# Patient Record
Sex: FEMALE | Race: WHITE | NOT HISPANIC OR LATINO | Employment: STUDENT | URBAN - METROPOLITAN AREA
[De-identification: names, ages, dates, MRNs, and addresses within clinical notes are randomized per-mention and may not be internally consistent; named-entity substitution may affect disease eponyms.]

---
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
## 2024-01-05 NOTE — PATIENT INSTRUCTIONS
Body Image in Adolescents   AMBULATORY CARE:   Body image  is the way you feel about your body and physical appearance  You may not like the way you look or the shape of your body  You may like some things about your body but not others  Your thoughts and feelings may change over time  Body image problems can be mild or severe  A severe body image problem can lead to long-term problems such as anorexia  A healthy body image is an important part of self-esteem (thinking you are valuable)  Common signs and symptoms of body image problems:  Some parts of your appearance will change over time  This may make negative thoughts temporary  Signs and symptoms that continue or become worse may be a sign of a more serious body image problem  Any of the following can become a long-term problem:  · Critical thoughts about your body or appearance    · A need to ask other people about how you look    · Not believing someone who compliments how you look    · A need to look in the mirror often, or not wanting to look in the mirror at all    · Spending long periods of time getting dressed or working on hair or makeup but still not being satisfied    · Seeing something in the mirror that is different from what people tell you they see    · Thoughts of something you want to change, such as your weight, that happen constantly    · Not wanting to be seen in public because you think others have negative thoughts of your appearance    · A need to eat very little or to count every calorie you eat    · Use of diet pills, smoking cigarettes, or exercising too much to increase weight loss  Call 911 if:   · You have thoughts of harming yourself, or you did something to harm yourself  Seek care immediately if:   · Your heart is beating fast     · You fainted  Contact your healthcare provider if:   · You have questions or concerns about your condition or care  Develop a healthy body image:   · Think of everything you like about yourself  Focus on qualities that are separate from your appearance  For example, you may be a talented artist or musician  You may like that you make friends easily or that you are always kind to people  · Do not compare yourself with anyone  You may have family members or friends you think are more attractive than you  Do not focus on differences or try to make yourself look like someone else  You are unique and can develop an appreciation for all of your own physical qualities  · Talk to someone you trust   A parent, friend, or school counselor can help you talk about how you feel about your body  Ask for more information on gender identity if this is creating body image problems  · Remember that puberty affects your appearance  You may gain weight and grow quickly during puberty  You may think your arms or legs are too long or your nose is too big  These are normal changes that will become balanced when puberty ends  You may start having acne from hormone changes  Your healthcare provider may be able to suggest ways to control acne or other problems during puberty  Develop a strong, healthy body:   · Do not focus on changes you think you need to make  For example, your healthcare provider can tell you if your weight is in a healthy range  Ask him to help you create a nutrition and exercise plan  Do not exercise too much or eat too little  Focus on eating healthy foods and exercising as part of a healthy lifestyle  A number on the scale or a clothing size should not be your goal     · Do not go on a diet  You need a variety of foods every day to get the nutrition you need  Do not get upset with yourself if you eat something that is not healthy  Just get back to the plan  Fad or crash diets are very dangerous, and you may gain back any weight lost  Aim for slow, steady weight loss with a goal of reaching a healthy weight  · Get more activity    Limit TV, computer, or video game time to less than 2 hours done per day  Aim for at least 1 hour of physical activity each day  Find activities you enjoy so you will want to continue  Ask your family or friends to do activities with you  This can help you establish a routine and may make activity more fun  · Do not smoke  Cigarettes contain nicotine  Nicotine is harmful to your health and can cause long-term health problems  E-cigarettes and smokeless tobacco products also contain nicotine and should not be used  Talk to your parents or someone you trust if you feel pressured to use a tobacco product, or if you need help quitting  Nicotine will not help you control your weight or fit in with members of a group  Remember your goals of building self-esteem and a strong, healthy body  Follow up with your healthcare provider as directed:  Write down your questions so you remember to ask them during your visits  © 2017 [address] [employer] Information is for End User's use only and may not be sold, redistributed or otherwise used for commercial purposes  All illustrations and images included in CareNotes® are the copyrighted property of A D A M , Inc  or Farhan Leo  The above information is an  only  It is not intended as medical advice for individual conditions or treatments  Talk to your doctor, nurse or pharmacist before following any medical regimen to see if it is safe and effective for you

## 2024-02-19 DIAGNOSIS — G93.2 PSEUDOTUMOR CEREBRI: ICD-10-CM

## 2024-02-20 RX ORDER — ACETAZOLAMIDE 500 MG/1
500 CAPSULE, EXTENDED RELEASE ORAL 2 TIMES DAILY
Qty: 180 CAPSULE | Refills: 2 | OUTPATIENT
Start: 2024-02-20

## 2024-03-12 ENCOUNTER — OFFICE VISIT (OUTPATIENT)
Dept: URGENT CARE | Facility: CLINIC | Age: 19
End: 2024-03-12
Payer: COMMERCIAL

## 2024-03-12 VITALS
HEART RATE: 81 BPM | OXYGEN SATURATION: 98 % | TEMPERATURE: 99 F | RESPIRATION RATE: 18 BRPM | SYSTOLIC BLOOD PRESSURE: 132 MMHG | WEIGHT: 269.6 LBS | DIASTOLIC BLOOD PRESSURE: 64 MMHG | BODY MASS INDEX: 46.48 KG/M2

## 2024-03-12 DIAGNOSIS — J02.9 ACUTE PHARYNGITIS, UNSPECIFIED ETIOLOGY: Primary | ICD-10-CM

## 2024-03-12 LAB — S PYO AG THROAT QL: NEGATIVE

## 2024-03-12 PROCEDURE — 99213 OFFICE O/P EST LOW 20 MIN: CPT | Performed by: NURSE PRACTITIONER

## 2024-03-12 PROCEDURE — 87880 STREP A ASSAY W/OPTIC: CPT | Performed by: NURSE PRACTITIONER

## 2024-03-12 RX ORDER — AMOXICILLIN 500 MG/1
500 CAPSULE ORAL EVERY 12 HOURS SCHEDULED
Qty: 20 CAPSULE | Refills: 0 | Status: SHIPPED | OUTPATIENT
Start: 2024-03-12 | End: 2024-03-22

## 2024-03-12 NOTE — PROGRESS NOTES
[facility]        NAME: Shasta Kenney is a 18 y.o. female  : 2005    MRN: [id number]  DATE: 2024  TIME: 3:43 PM    Assessment and Plan   Acute pharyngitis, unspecified etiology [J02.9]  1. Acute pharyngitis, unspecified etiology  POCT rapid strepA    amoxicillin (AMOXIL) 500 mg capsule        Acute symptomatic POCT rapid strep negative.  Educated patient most likely viral in origin no recommendation for antibiotic at this time however will provide prescription for patient for amoxicillin 500 mg twice daily x 10 days if no improvement in the next couple days can start taking if symptoms worsen or no improvement should follow-up with PCP    Patient Instructions       Follow up with PCP in 3-5 days.  Proceed to  ER if symptoms worsen.    If tests have been performed at [facility], our office will contact you with results if changes need to be made to the care plan discussed with you at the visit.  You can review your full results on [facility].    Chief Complaint     Chief Complaint   Patient presents with   • Sore Throat   • Nasal Congestion     Patient reports having symptoms for about 2 days.          History of Present Illness       Patient is a 18-year-old female arrives with complaints of sore throat and nasal congestion fatigue x 2 days.  Does report slight cough and sneezing.  Denies all other symptoms denies shortness of breath chest pain  And fever        Review of Systems   Review of Systems   Constitutional:  Negative for activity change, appetite change, chills, fatigue and fever.   HENT:  Positive for congestion, sneezing and sore throat. Negative for postnasal drip and rhinorrhea.    Respiratory:  Positive for cough. Negative for chest tightness, shortness of breath and wheezing.    Cardiovascular:  Negative for chest pain and palpitations.   Gastrointestinal:  Negative for abdominal pain, constipation, diarrhea, nausea and vomiting.   Musculoskeletal:  Negative for  arthralgias and myalgias.   Skin:  Negative for color change, pallor and rash.   Neurological:  Negative for dizziness, weakness, light-headedness and headaches.   Hematological:  Negative for adenopathy.   Psychiatric/Behavioral:  Negative for agitation and confusion.          Current Medications       Current Outpatient Medications:   •  amoxicillin (AMOXIL) 500 mg capsule, Take 1 capsule (500 mg total) by mouth every 12 (twelve) hours for 10 days, Disp: 20 capsule, Rfl: 0  •  acetaZOLAMIDE (DIAMOX) 500 mg capsule, Take 1 capsule (500 mg total) by mouth 2 (two) times a day, Disp: 60 capsule, Rfl: 3  •  fluticasone (FLONASE) 50 mcg/act nasal spray, 2 sprays into each nostril daily (Patient not taking: Reported on 1/26/2023), Disp: 16 g, Rfl: 0  •  ondansetron (ZOFRAN) 4 mg tablet, Take 1 tablet (4 mg total) by mouth every 6 (six) hours as needed for nausea or vomiting (Patient not taking: Reported on 1/26/2023), Disp: 12 tablet, Rfl: 0    Current Allergies     Allergies as of 03/12/2024   • (No Known Allergies)            The following portions of the patient's history were reviewed and updated as appropriate: allergies, current medications, past family history, past medical history, past social history, past surgical history and problem list.     Past Medical History:   Diagnosis Date   • Osgood-Schlatter's disease, left        History reviewed. No pertinent surgical history.    Family History   Problem Relation Age of Onset   • Diabetes Mother    • Hypertension Mother    • No Known Problems Father    • Migraines Neg Hx    • Seizures Neg Hx          Medications have been verified.        Objective   /64   Pulse 81   Temp 99 °F (37.2 °C) (Tympanic)   Resp 18   Wt 122 kg (269 lb 9.6 oz)   SpO2 98%   BMI 46.48 kg/m²   No LMP recorded.       Physical Exam     Physical Exam  Vitals and nursing note reviewed.   Constitutional:       General: She is not in acute distress.     Appearance: Normal appearance.  She is ill-appearing. She is not diaphoretic.   HENT:      Head: Normocephalic and atraumatic.      Right Ear: Tympanic membrane, ear canal and external ear normal. There is no impacted cerumen.      Left Ear: Tympanic membrane, ear canal and external ear normal. There is no impacted cerumen.      Nose: Congestion present. No rhinorrhea.      Mouth/Throat:      Pharynx: Posterior oropharyngeal erythema present.   Eyes:      General: No scleral icterus.        Right eye: No discharge.         Left eye: No discharge.      Conjunctiva/sclera: Conjunctivae normal.   Cardiovascular:      Rate and Rhythm: Normal rate and regular rhythm.   Pulmonary:      Effort: Pulmonary effort is normal. No respiratory distress.      Breath sounds: Normal breath sounds. No stridor. No wheezing, rhonchi or rales.   Musculoskeletal:         General: Normal range of motion.      Cervical back: Normal range of motion.   Lymphadenopathy:      Cervical: Cervical adenopathy present.   Skin:     Coloration: Skin is not jaundiced or pale.      Findings: No bruising, erythema or rash.   Neurological:      General: No focal deficit present.      Mental Status: She is alert and oriented to person, place, and time.   Psychiatric:         Mood and Affect: Mood normal.         Behavior: Behavior normal.         Thought Content: Thought content normal.         Judgment: Judgment normal.

## 2024-03-12 NOTE — LETTER
March 12, 2024     Patient: Shasta Kenney   YOB: 2005   Date of Visit: 3/12/2024       To Whom it May Concern:    Shasta Kenney was seen in my clinic on 3/12/2024. She may return to school on 3/15/2024 . Can return sooner if symptoms improve.    If you have any questions or concerns, please don't hesitate to call.         Sincerely,          LULY Alvarez

## 2024-04-03 ENCOUNTER — TELEPHONE (OUTPATIENT)
Dept: NEUROLOGY | Facility: CLINIC | Age: 19
End: 2024-04-03

## 2024-05-13 ENCOUNTER — TELEPHONE (OUTPATIENT)
Dept: NEUROLOGY | Facility: CLINIC | Age: 19
End: 2024-05-13

## 2024-05-13 NOTE — TELEPHONE ENCOUNTER
Called patient and left voicemail to confirm their upcoming appointment with Dr. Arredondo. Informed patient about arriving in the [place] location 15 minutes prior to appointment to get checked in and go over chart.

## 2024-05-20 ENCOUNTER — OFFICE VISIT (OUTPATIENT)
Dept: NEUROLOGY | Facility: CLINIC | Age: 19
End: 2024-05-20
Payer: COMMERCIAL

## 2024-05-20 VITALS
SYSTOLIC BLOOD PRESSURE: 136 MMHG | TEMPERATURE: 97.9 F | HEIGHT: 64 IN | DIASTOLIC BLOOD PRESSURE: 86 MMHG | WEIGHT: 274.5 LBS | OXYGEN SATURATION: 99 % | BODY MASS INDEX: 46.86 KG/M2 | HEART RATE: 97 BPM

## 2024-05-20 DIAGNOSIS — R29.818 SUSPECTED SLEEP APNEA: ICD-10-CM

## 2024-05-20 DIAGNOSIS — G93.2 IIH (IDIOPATHIC INTRACRANIAL HYPERTENSION): Primary | ICD-10-CM

## 2024-05-20 DIAGNOSIS — E53.8 VITAMIN B 12 DEFICIENCY: ICD-10-CM

## 2024-05-20 DIAGNOSIS — E55.9 VITAMIN D DEFICIENCY: ICD-10-CM

## 2024-05-20 DIAGNOSIS — G43.009 MIGRAINE WITHOUT AURA AND WITHOUT STATUS MIGRAINOSUS, NOT INTRACTABLE: ICD-10-CM

## 2024-05-20 PROBLEM — S06.0X9A CONCUSSION WITH MODERATE (1-24 HOURS) LOSS OF CONSCIOUSNESS: Status: RESOLVED | Noted: 2019-09-17 | Resolved: 2024-05-20

## 2024-05-20 PROCEDURE — 99205 OFFICE O/P NEW HI 60 MIN: CPT | Performed by: PSYCHIATRY & NEUROLOGY

## 2024-05-20 PROCEDURE — 99417 PROLNG OP E/M EACH 15 MIN: CPT | Performed by: PSYCHIATRY & NEUROLOGY

## 2024-05-20 RX ORDER — ACETAZOLAMIDE 500 MG/1
500 CAPSULE, EXTENDED RELEASE ORAL 2 TIMES DAILY
Qty: 180 CAPSULE | Refills: 4 | Status: SHIPPED | OUTPATIENT
Start: 2024-05-20

## 2024-05-20 NOTE — PROGRESS NOTES
Review of Systems   Constitutional:  Negative for appetite change, fatigue and fever.   HENT: Negative.  Negative for hearing loss, tinnitus, trouble swallowing and voice change.    Eyes: Negative.  Negative for photophobia, pain and visual disturbance.   Respiratory: Negative.  Negative for shortness of breath.    Cardiovascular: Negative.  Negative for palpitations.   Gastrointestinal: Negative.  Negative for nausea and vomiting.   Endocrine: Negative.  Negative for cold intolerance.   Genitourinary: Negative.  Negative for dysuria, frequency and urgency.   Musculoskeletal:  Negative for back pain, gait problem, myalgias, neck pain and neck stiffness.   Skin: Negative.  Negative for rash.   Allergic/Immunologic: Negative.    Neurological:  Positive for numbness (in both feet, mainly right - pins and needles - occasional). Negative for dizziness, tremors, seizures, syncope, facial asymmetry, speech difficulty, weakness, light-headedness and headaches.   Hematological: Negative.  Does not bruise/bleed easily.   Psychiatric/Behavioral: Negative.  Negative for confusion, hallucinations and sleep disturbance.    All other systems reviewed and are negative.

## 2024-05-20 NOTE — PATIENT INSTRUCTIONS
"Please do follow-up with ophthalmology for regular eye exams and please have note faxed to me      Whenever the sleep study is coming up if you want to just reach out to me and we may at least hold the p.m. dose because you generally do okay off of the p.m. dose and this medication can treat sleep apnea and cause a problem to be underestimated.    Do continue to not sleep on your back normally  -If you feel like you could sleep on your back that night only, certainly could try to sleep on your back for the sleep study, but not if you feel like you cannot sleep this way.  Just getting sleep is the most important thing, as the machine thinks you are sleeping the entire time it is plugged in. Otherwise, we discussed home sleep study can underestimate the problem.  If it comes back that you almost have sleep apnea or other sleep disorder, but not meeting criteria, we could consider in lab study and reach out to me if you would like this ordered before next visit.    I am placing a referral for a sleep study and if it is abnormal, it includes a referral to the sleep medicine specialist team to further evaluate your sleep issues.    Please call [phone] to schedule the sleep study and we discussed due to the new order being labeled \" ambulatory referral to sleep medicine\" you have to wait 2-6 days for the sleep team to turn this referral into the actual sleep study order that you can schedule, otherwise if you call now they may say there is no order for sleep study, just an order for a referral, because they can not see it yet on their end.  The referral to sleep medicine piece is only if there is abnormalities and you need to see them afterwards.    After the sleep study is completed if there is abnormal results that need treatment, I recommend you see one of the following providers:  Pino Eldridge DO   Carlos Iglesias DO  Daniel Perez " LULY Coates PA-C        Headache/migraine treatment:   Rescue medications (for immediate treatment of a headache):   It is ok to take ibuprofen, acetaminophen or naproxen (Advil, Tylenol,  Aleve, Excedrin) if they help your headaches you should limit these to No more than 3 times a week to avoid medication overuse/rebound headaches.         Over the counter preventive supplements for headaches/migraines (if you try, try for 3 months straight)  (to take every day to help prevent headaches - not to take at the time of headache):  There are combo pills online of these - none of which regulated by FDA and double check dosing - take appropriate dose only once a day- preventa migraine, migravent, mind ease, migrelief   [] Magnesium 400 - 500 mg daily (If any diarrhea or upset stomach, decrease dose  as tolerated)  [] Riboflavin (Vitamin B2) 400mg daily - try online   (FYI B2 may make your urine bright/neon yellow)  AND/OR  [] Herbal medication: Petasites/Butterbur 150 mg daily - try online  (When choosing your Butterbur online or in the store, beware that there are some poor preps containing pyrrolizidine alkaloids (PAs) that can be harmful to the liver. Therefore, do not use butterbur products that are not labeled as PA-free.)      Prescription preventive medications for headaches/migraines   (to take every day to help prevent headaches - not to take at the time of headache):  [x] I would recommend a list to have a sleep study coming up in the next week then continue to take the medication once a day if so otherwise I recommend increasing it to at least twice daily dosing prior to checking the labs that I ordered -and the twice a day dosing you could either take at 8/9 AM and 4/5 PM or 8/9 AM and 8/9 PM which ever you find works better for your body and is easier to remember        *Typically these types of medications take time until you see the benefit, although some may see improvement in days, often it may  take weeks, especially if the medication is being titrated up to a beneficial level. Please contact us if there are any concerns or questions regarding the medication.     Lifestyle Recommendations:  [x] SLEEP - Maintain a regular sleep schedule: Adults need at least 7-8 hours of uninterrupted a night. Maintain good sleep hygiene:  Going to bed and waking up at consistent times, avoiding excessive daytime naps, avoiding caffeinated beverages in the evening, avoid excessive stimulation in the evening and generally using bed primarily for sleeping.  One hour before bedtime would recommend turning lights down lower, decreasing your activity (may read quietly, listen to music at a low volume). When you get into bed, should eliminate all technology (no texting, emailing, playing with your phone, iPad or tablet in bed).  [x] HYDRATION - Maintain good hydration.  Drink  2L of fluid a day (4 typical small water bottles)  [x] DIET - Maintain good nutrition. In particular don't skip meals and try and eat healthy balanced meals regularly.  [x] TRIGGERS - Look for other triggers and avoid them: Limit caffeine to 1-2 cups a day or less. Avoid dietary triggers that you have noticed bring on your headaches (this could include aged cheese, peanuts, MSG, aspartame and nitrates).  [x] EXERCISE - physical exercise as we all know is good for you in many ways, and not only is good for your heart, but also is beneficial for your mental health, cognitive health and  chronic pain/headaches. I would encourage at the least 5 days of physical exercise weekly for at least 30 minutes.     Education and Follow-up  [x] Please call with any questions or concerns. Of course if any new concerning symptoms go to the emergency department.  [x] Follow up 3 months, sooner if needed

## 2024-05-20 NOTE — PROGRESS NOTES
[facility] Consult  PATIENT:  Shasta Kenney  MRN:  [id number]  :  2005  DATE OF SERVICE:  2024  REFERRED BY: Self, Referral  PMD: Herminia Sharp DO    Assessment/Plan:     Shasta Kenney is a delightful 18 y.o. female with a past medical history that includes chronic fatigue, history of concussion, depression, right ankle instability, neck tension, idiopathic intracranial hypertension, headaches, migraines, Osgood Schlatter's disease of left lower extremity and pain, irregular menses, HSV, swollen gums, BMI over 45, rhinitis, nausea, class IV Mallampati score (Childrens tooth brush), chronic back pain  referred here for evaluation of headache.  My initial evaluation 2024     IIH (idiopathic intracranial hypertension)  Migraine without aura and without status migrainosus, not intractable  Suspected sleep apnea  She reports she has had headaches dating back to her childhood potentially, had a handful of sports related concussions, but headaches significantly worsened around age 15, she went snowboarding which was not atypical as she was an instructor at the time and the next day awoke and felt like she could not move and every time she would stand up her vision would go black.  Per chart review unrelenting headache started around 2021 unprovoked, 1/15/2021 ophthalmology found papilledema and visual field deficits on the periphery, admitted where lumbar puncture was technically limited and opening pressure was not able to be measured, had MRI MRV and CT and began Diamox/acetazolamide 2021.  She subsequently followed with pediatric neurology both at [facility] and [facility], please see EMR and HPI for details.  She has not followed up with neurology at least since 2023 and Shasta has been periodically compliant with dosing, but trying her best.  As of initial visit 2024 she has not seen ophthalmology in years either.  She is here to establish care.   She reports headaches are typically apex of scalp and sometimes down the temporal region, pressure, without typical aura and with typical associated migrainous features as well as features expected from IIH.  She is aware of severe and permanent vision loss if this is not adequately treated.  - As of 5/20/2024: She reports currently she only takes acetazolamide long-acting once in the a.m. typically, which has been more helpful than the short acting which we discussed why this might be.  She reports headaches approximately 2-3 times a month, but vision changes are constant episodically and depends on the night sleep.  We discussed she needs to follow-up with ophthalmology ASAP to determine level of papilledema, OCT, visual fields and she needs to start taking acetazolamide 500 mg twice daily at minimum rather than just once in the morning as she is likely having wearing off the rest of the day.  We discussed once she obtains the labs on this dose twice daily, depending on papilledema, how she tolerates the medication as far as electrolytes, we could consider 3 times daily dosing.  We also discussed I suspect she has sleep apnea underlying this and home sleep study ordered although acetazolamide also treat sleep apnea and thus may underestimate the problem significantly and may need in lab study to further evaluate.    Workup:  - MRI Brain with and without contrast 1/22/21: No acute intracranial abnormality. Mild papilledema with equivocal findings of intracranial hypertension. 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  Please see the separate MRA study report.  She ended up following up with pediatric neurology at [facility] who informed her that the right transverse dural venous sinus issue was developmental or congenital.*As of my retrospective review 5/20/2024 we discussed that she does have features of idiopathic intracranial hypertension on imaging including  partially empty sella, prominent optic nerve sheath bilaterally left greater than right, tight ventricles, cerebellar tonsils overlie the foramen magnum with tight junction  - MRV head without contrast 1/22/21: Approximate 4 cm region of marked stenosis in the right distal transverse and proximal sigmoid dural venous sinus.  This is likely developmental or congenital.*As of my retrospective review 5/20/2024 we discussed the [place] behind these findings and how DrJyotsna Still do not quite understand for sure whether this is developmental/congenital and the pressure is worse on that side because of this or potentially secondary to increased intracranial pressure collapsing the venous sinus, basically chicken/egg scenario and certainly sounds like intervention was considered in 2021 and not pursued due to thought to be congenital  -     Comprehensive metabolic panel  -     CBC and differential  -     TSH, 3rd generation with Free T4 reflex  -     Vitamin B12  -     Vitamin D 25 hydroxy    Preventative:  - we discussed headache hygiene and lifestyle factors that may improve headaches  -  increase -   acetaZOLAMIDE (DIAMOX) 500 mg capsule; Take 1 capsule (500 mg total) by mouth 2 (two) times a day -we discussed unless she has upcoming sleep study where we are going to hold the p.m. dose of acetazolamide since she has not been taking it more than once a day generally anyway, she absolutely should be taking this medication at least twice daily otherwise I suspect is wearing off and potentially causing rebound higher pressure.  Once we see what her labs are on the twice daily dosing we could even consider 3 times daily dosing to see if this could help more and she needs to follow back up with eye doctor for dilated eye exam to assess for papilledema. Discussed proper use, possible side effects and risks.  - Currently on through other providers: I am taking over acetazolamide prescription  - Past/ failed/contraindicated:  Tolerated magnesium, riboflavin  - future options: Topiramate/Topamax, methazolamide, optic nerve sheath fenestration, shunt, stent*, furosemide/Lasix, CGRP med, botox    Rescue:  - recommend not taking over-the-counter or prescription pain medications more than 3 days per week to prevent medication overuse/rebound headache  - She is not currently interested in prescription rescue option  - Currently on through other providers: -acetaminophen is all she takes typically  - Past/ failed/contraindicated: OTC meds  -Past/tolerated: Medrol Dosepak, prednisone, migraine cocktail 2021 ketorolac/Toradol, IV fluids, metoclopramide, Benadryl  - future options:  Triptan,  prochlorperazine, Toradol IM or p.o., could consider trial of 5 days of Depakote 500 mg nightly or dexamethasone 2 mg daily for prolonged migraine, ubrelvy, reyvow, nurtec    Suspected sleep apnea  -     Ambulatory Referral to Sleep Medicine; Future. This is not a referral for sleep medicine only, it is referral for a home sleep study and includes referral to sleep medicine if abnormal.    Patient instructions      Please do follow-up with ophthalmology for regular eye exams and please have note faxed to me      Whenever the sleep study is coming up if you want to just reach out to me and we may at least hold the p.m. dose because you generally do okay off of the p.m. dose and this medication can treat sleep apnea and cause a problem to be underestimated.    Do continue to not sleep on your back normally  -If you feel like you could sleep on your back that night only, certainly could try to sleep on your back for the sleep study, but not if you feel like you cannot sleep this way.  Just getting sleep is the most important thing, as the machine thinks you are sleeping the entire time it is plugged in. Otherwise, we discussed home sleep study can underestimate the problem.  If it comes back that you almost have sleep apnea or other sleep disorder, but not  "meeting criteria, we could consider in lab study and reach out to me if you would like this ordered before next visit.    I am placing a referral for a sleep study and if it is abnormal, it includes a referral to the sleep medicine specialist team to further evaluate your sleep issues.    Please call [phone] to schedule the sleep study and we discussed due to the new order being labeled \" ambulatory referral to sleep medicine\" you have to wait 2-6 days for the sleep team to turn this referral into the actual sleep study order that you can schedule, otherwise if you call now they may say there is no order for sleep study, just an order for a referral, because they can not see it yet on their end.  The referral to sleep medicine piece is only if there is abnormalities and you need to see them afterwards.    After the sleep study is completed if there is abnormal results that need treatment, I recommend you see one of the following providers:  Pino Eldridge DO   Carlos Coates PA-C      Headache/migraine treatment:   Rescue medications (for immediate treatment of a headache):   It is ok to take ibuprofen, acetaminophen or naproxen (Advil, Tylenol,  Aleve, Excedrin) if they help your headaches you should limit these to No more than 3 times a week to avoid medication overuse/rebound headaches.       Over the counter preventive supplements for headaches/migraines (if you try, try for 3 months straight)  (to take every day to help prevent headaches - not to take at the time of headache):  There are combo pills online of these - none of which regulated by FDA and double check dosing - take appropriate dose only once a day- preventa migraine, migravent, mind ease, migrelief   [] Magnesium 400 - 500 mg daily (If any diarrhea or upset stomach, decrease dose  as tolerated)  [] Riboflavin (Vitamin B2) 400mg " daily - try online   (FYI B2 may make your urine bright/neon yellow)  AND/OR  [] Herbal medication: Petasites/Butterbur 150 mg daily - try online  (When choosing your Butterbur online or in the store, beware that there are some poor preps containing pyrrolizidine alkaloids (PAs) that can be harmful to the liver. Therefore, do not use butterbur products that are not labeled as PA-free.)      Prescription preventive medications for headaches/migraines   (to take every day to help prevent headaches - not to take at the time of headache):  [x] I would recommend a list to have a sleep study coming up in the next week then continue to take the medication once a day if so otherwise I recommend increasing it to at least twice daily dosing prior to checking the labs that I ordered -and the twice a day dosing you could either take at 8/9 AM and 4/5 PM or 8/9 AM and 8/9 PM which ever you find works better for your body and is easier to remember        *Typically these types of medications take time until you see the benefit, although some may see improvement in days, often it may take weeks, especially if the medication is being titrated up to a beneficial level. Please contact us if there are any concerns or questions regarding the medication.     Lifestyle Recommendations:  [x] SLEEP - Maintain a regular sleep schedule: Adults need at least 7-8 hours of uninterrupted a night. Maintain good sleep hygiene:  Going to bed and waking up at consistent times, avoiding excessive daytime naps, avoiding caffeinated beverages in the evening, avoid excessive stimulation in the evening and generally using bed primarily for sleeping.  One hour before bedtime would recommend turning lights down lower, decreasing your activity (may read quietly, listen to music at a low volume). When you get into bed, should eliminate all technology (no texting, emailing, playing with your phone, iPad or tablet in bed).  [x] HYDRATION - Maintain good  hydration.  Drink  2L of fluid a day (4 typical small water bottles)  [x] DIET - Maintain good nutrition. In particular don't skip meals and try and eat healthy balanced meals regularly.  [x] TRIGGERS - Look for other triggers and avoid them: Limit caffeine to 1-2 cups a day or less. Avoid dietary triggers that you have noticed bring on your headaches (this could include aged cheese, peanuts, MSG, aspartame and nitrates).  [x] EXERCISE - physical exercise as we all know is good for you in many ways, and not only is good for your heart, but also is beneficial for your mental health, cognitive health and  chronic pain/headaches. I would encourage at the least 5 days of physical exercise weekly for at least 30 minutes.     Education and Follow-up  [x] Please call with any questions or concerns. Of course if any new concerning symptoms go to the emergency department.  [x] Follow up 3 months, sooner if needed        CC:   We had the pleasure of evaluating Shasta Kenney in neurological consultation today. Shasta Kenney is a   right handed female who presents today for evaluation of headaches.     History obtained from patient as well as available medical record review.  History of Present Illness:   Current medical illnesses  or past medical history include chronic fatigue, history of concussion, depression, right ankle instability, neck tension, idiopathic intracranial hypertension, headaches, migraines, Osgood Schlatter's disease of left lower extremity and pain, irregular menses, HSV, swollen gums, BMI over 45, rhinitis, nausea, class IV Mallampati score (Childrens tooth brush), chronic back pain     She is in nursing school     She previously followed with pediatric neurology Dr. Pedraza  - otherwise healthy except for past sports related concussion until onset of unrelenting headaches 1/6/2021 which was not provoked by anything particular at the time.  On 1/15/2021 ophthalmology found papilledema and visual field deficits,  "suspected IIH, sent to [facility] for admission where she had labs, LP, CT and per report \"LP was technically limited and opening pressure was not able to be measured.\"  Had MRI/MRV outpatient.  Began Diamox 1/22/2021.  Ophthalmologist a week later made no changes  -Dr. Pedraza referred her due to MRV stenosis to Dr. Marleni James 3/2021 at the [facility] at [facility]. whose note was reviewed and appears they discussed   - incidental finding of narrowing on MRV, pseudotumor/idiopathic intracranial hypertension, continue acetazolamide/Diamox (at that time was on 250 mg tabs to twice daily), dose and duration to be determined mainly by ophthalmologist.  They instructed her not to drive until she has recovered and avoid collision sports until peripheral vision has normalized.  - Dr. Pedraza -appears last visit 1/26/2023 clinically improved, but had self discontinued acetazolamide/Diamox fall 2022 due to noncompliance -she requested transfer of care to an adult neurologist due to age.  She recommended resuming acetazolamide/Diamox up to 500 mg twice daily and follow-up with ophthalmology.  Since she had increased weight gain over the past year of about 30 pounds referred to neurology, discussed endocrinology if A1c was elevated, discussed diet, hydration, sleep.      Following with nutrition and weight management visit coming  Usually focuses better with a lot on in the background     Headaches started at what age?  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   - as of 5/20/2024: has been on diamox for a year in the past, went back on when she gets back into a routine, started around the beginning of last semester September 2023 and this semester January 2024 but always tries to take when she can remember    In a month 2-3 a month  Vision changes are constant, depend on lasts night sleep - if any trouble sleeping which can happen after bad nights sleep will call out since would never drive if could not see well and sometimes it is worse than when I the other and always worse the entire periphery    What time of the day do the headaches start? Worse in the am and gets better  How long do the headaches last? Over 4 hours without meds - 2 days with meds even  Are you ever headache free? Yes     Aura? Without aura - not classically     Last eye exam:   -She reports she follows with Dr. Lopez and she cannot remember what exactly he said last just tells her she is stable/stagnant and just lose weight - can't recall last visit    Vision changes are constant, depend on lasts night sleep - if any trouble sleeping which can happen after bad nights sleep will call out since would never drive if could not see well and sometimes it is worse than when I the other and always worse the entire periphery    Where is your headache located and pain quality?   - apex bilateral and temporal - pressure and like something is trying to escape out of the apex center of her scalp    What is the intensity of pain? Average: 4-5/10, worst 8/10  Associated symptoms:   [x] Nausea       [] Vomiting         [x] Photophobia     [x]Phonophobia   - less   [] Osmophobia - just axe body spray   [x] Blurred vision   [x] Light-headed or dizzy - vertigo in the am   [x] Tinnitus - sometimes both ears - 2-3 mins, not often  Used to get frequent ear infections    [] Hands or feet tingle or feel numb/paresthesias  - not  associated with headache - occasional pins and needles related to prolonged times and resolves with walking   [] Ptosis      [] Facial droop  [] Lacrimation - sometimes when starring too long or dry    [x] Nasal congestion/rhinorrhea  - recently yes, seasonal allergies       Things that make the headache worse? No specific movements  Headache triggers:  sleep - not enough,     Have you seen someone else for headaches or pain? Yes, pediatric neurology   Have you had trigger point injection performed and how often? No  Have you had Botox injection performed and how often? No   Have you had epidural injections or transforaminal injections performed? No  Are you current pregnant or planning on getting pregnant?  IUD for 2 years/On birth control, no plans and understands to inform doctors if this changes due to to medications potentially harming fetus  Have you ever had any Brain imaging? yes     What medications do you take or have you taken for your headaches?   ABORTIVE/pertinent p.r.n. Meds:      Tylenol -acetaminophen is all she takes typically    At [facility] she feels like they told her she has smaller veins on 1 side of her head and if she were to take a blood thinner then she might develop a clot -we discussed I am not sure where this notion would come from but she does have venous sinus stenosis, but should not keep her from NSAIDs    Past  Metoclopramide/Reglan and ketorolac/Toradol 2021  Medrol Dosepak 2021 - doesn't recall this    PREVENTIVE/pertinent daily meds:       Acetazolamide/Diamox 500 mg capsule twice daily last prescribed 8/28/2023 - when she wakes up 8/9 AM and then pm around 9 PM   -   Really good at taking in the am, no nighttime routine so forgets a lot     Past/ failed/contraindicated:  She did worse on the short acting tabs just twice a day and we discussed this is likely because it was wearing off midday and does not last 12 hours  Chiropractor     LIFESTYLE  Sleep   - averages: 12-7/8 hours,  12-18 hours if she could  Snores  Sleep is not restful  BMI over 45  HTN  Never had a sleep study  Problems falling asleep?:   not usually   Problems staying asleep?:  Yes, wakes constantly, 3-4 times a night or more  Naps during the day     Water: 120 ox x3 per day  Caffeine: on a good day - large match with two chots of espresso or one other days, used to have a lot, has cut back     Mood: anxiety, depression , comes and goes, MDD, stable right now, following with PCP, in counseling at school - she is helpful       The following portions of the patient's history were reviewed and updated as appropriate: allergies, current medications, past family history, past medical history, past social history, past surgical history and problem list.    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  Education: in nursing school  Lives with mom, dad, [address] [place] [address]     Past Medical History:     Past Medical History:   Diagnosis Date    Osgood-Schlatter's disease, left        Patient Active Problem List   Diagnosis    Osgood-Schlatter's disease of left lower extremity    Pharyngitis    Swollen gums    Stomatitis, viral    Intractable acute post-traumatic headache    Convergence insufficiency    Recurrent herpes labialis    Chronic fatigue    Screening for HIV (human immunodeficiency virus)    Irregular periods    Contraception management    Pseudotumor cerebri    History of multiple concussions    BMI 40.0-44.9, adult (HCC)       Medications:      Current Outpatient Medications   Medication Sig Dispense Refill    acetaZOLAMIDE (DIAMOX) 500 mg capsule Take 1 capsule (500 mg total) by mouth 2 (two) times a day 180 capsule 4    fluticasone (FLONASE) 50 mcg/act nasal spray 2 sprays into each nostril daily 16 g 0     ondansetron (ZOFRAN) 4 mg tablet Take 1 tablet (4 mg total) by mouth every 6 (six) hours as needed for nausea or vomiting (Patient not taking: Reported on 1/26/2023) 12 tablet 0     No current facility-administered medications for this visit.        Allergies:    No Known Allergies    Family History:     Family History   Problem Relation Age of Onset    Diabetes Mother     Hypertension Mother     No Known Problems Father     Migraines Neg Hx     Seizures Neg Hx        Social History:       Social History     Socioeconomic History    Marital status: Single     Spouse name: Not on file    Number of children: Not on file    Years of education: Not on file    Highest education level: Not on file   Occupational History    Not on file   Tobacco Use    Smoking status: Never     Passive exposure: Current    Smokeless tobacco: Never   Vaping Use    Vaping status: Former    Substances: Nicotine   Substance and Sexual Activity    Alcohol use: No    Drug use: No    Sexual activity: Yes     Partners: Male     Birth control/protection: Condom Male   Other Topics Concern    Not on file   Social History Narrative    Mom, Dad, younger brother 11 y/o    2 dogs and a parrot        In 10 th grade, [employer] program      Social Determinants of Health     Financial Resource Strain: Not on file   Food Insecurity: Not on file   Transportation Needs: Not on file   Physical Activity: Not on file   Stress: Not on file   Social Connections: Unknown (1/5/2021)    Social Connection and Isolation Panel [NHANES]     Frequency of Communication with Friends and Family: Not on file     Frequency of Social Gatherings with Friends and Family: Not on file     Attends Confucianist Services: Not on file     Active Member of Clubs or Organizations: Not on file     Attends Club or Organization Meetings: Not on file     Marital Status: Never    Intimate Partner Violence: Not At Risk (1/20/2023)    Humiliation, Afraid, Rape, and Kick questionnaire     " Fear of Current or Ex-Partner: No     Emotionally Abused: No     Physically Abused: No     Sexually Abused: No   Housing Stability: Not on file         Objective:       Physical Exam:                                                                 Vitals:            Constitutional:    /86 (BP Location: Left arm, Patient Position: Sitting, Cuff Size: Large)   Pulse 97   Temp 97.9 °F (36.6 °C) (Temporal)   Ht 5' 4\" (1.626 m)   Wt 125 kg (274 lb 8 oz)   SpO2 99%   BMI 47.12 kg/m²   BP Readings from Last 3 Encounters:   05/20/24 136/86   03/12/24 132/64   08/15/23 126/84     Pulse Readings from Last 3 Encounters:   05/20/24 97   03/12/24 80   08/15/23 75         Well developed, well nourished, well groomed. No dysmorphic features.       Psychiatric:  Normal behavior and appropriate affect        Neurological Examination:     Mental status/cognitive function:   Recent and remote memory intact. Attention span and concentration as well as fund of knowledge are appropriate for age. Normal language and spontaneous speech.     Cranial Nerves:  II-visual fields full.   Fundi poorly visualized due to pupillary constriction  III, IV, VI-Pupils were equal, round, and reactive to light and accommodation. Extraocular movements were full and conjugate without nystagmus.  V-facial sensation symmetric.    VII-facial expression symmetric, intact forehead wrinkle, strong eye closure, symmetric smile    VIII-hearing grossly intact bilaterally   IX, X-palate elevation symmetric, no dysarthria.   XI-shoulder shrug strength intact    XII-tongue protrusion midline.    Motor Exam: symmetric bulk and tone throughout, no pronator drift. Power/strength 5/5 bilateral upper and lower extremities, no atrophy, fasciculations or abnormal movements noted.   Sensory: grossly intact light touch in all extremities.   Reflexes: brachioradialis 0, biceps 0, knee 0, ankle 0 bilaterally. No ankle clonus   Coordination: Finger nose finger intact " bilaterally, no apparent dysmetria, ataxia or tremor noted  Gait: steady casual and tandem gait.       Pertinent lab results:   See EMR for recent labs  -8/16/2023: TSH normal   CBC unremarkable  CMP remarkable for chloride 111, CO2 17-no concerns per PCP       Imaging:   I have personally reviewed imaging and radiology read   - MRI Brain with and without contrast 1/22/21: No acute intracranial abnormality.  Mild papilledema with equivocal findings of intracranial hypertension.  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  Please see the separate MRA study report   - MRV head without contrast 1/22/21: Approximate 4 cm region of marked stenosis in the right distal transverse and proximal sigmoid dural venous sinus.  This is likely developmental or congenital.    - non contrast head CT 1/15/21: Normal unenhanced CT of the head.   -CT C-spine without contrast 1/11/2020: There is nonspecific straightening of the cervical lordosis without subluxation. No acute fracture.      Review of Systems:   ROS obtained by medical assistant and reviewed, but if any symptoms listed below say negative, does not mean patient has not had this symptom since last visit, please see HPI for details of symptoms discussed this visit.  Regarding any abnormal or positive findings in ROS that are not neurologic related, patient instructed to address these issues with PCP or go to the ER if they are severe.      Review of Systems   Constitutional:  Negative for appetite change, fatigue and fever.   HENT: Negative.  Negative for hearing loss, tinnitus, trouble swallowing and voice change.    Eyes: Negative.  Negative for photophobia, pain and visual disturbance.   Respiratory: Negative.  Negative for shortness of breath.    Cardiovascular: Negative.  Negative for palpitations.   Gastrointestinal: Negative.  Negative for nausea and vomiting.   Endocrine: Negative.  Negative for cold  intolerance.   Genitourinary: Negative.  Negative for dysuria, frequency and urgency.   Musculoskeletal:  Negative for back pain, gait problem, myalgias, neck pain and neck stiffness.   Skin: Negative.  Negative for rash.   Allergic/Immunologic: Negative.    Neurological:  Positive for numbness (in both feet, mainly right - pins and needles - occasional). Negative for dizziness, tremors, seizures, syncope, facial asymmetry, speech difficulty, weakness, light-headedness and headaches.   Hematological: Negative.  Does not bruise/bleed easily.   Psychiatric/Behavioral: Negative.  Negative for confusion, hallucinations and sleep disturbance.    All other systems reviewed and are negative.       I have spent 70 minutes with Patient today in which greater than 50% of this time was spent in counseling/coordination of care regarding Diagnostic results, Prognosis, Risks and benefits of tx options, Instructions for management, Patient and family education, Importance of tx compliance, Risk factor reductions, Impressions, Counseling / Coordination of care, Documenting in the medical record, Reviewing / ordering tests, medicine, procedures  , Obtaining or reviewing history  , and Communicating with other healthcare professionals . I also spent 28 minutes non face to face for this patient the same day.         Author:  Justina Arredondo MD 5/20/2024 5:41 PM

## 2024-05-23 DIAGNOSIS — R29.818 SUSPECTED SLEEP APNEA: Primary | ICD-10-CM

## 2024-06-26 ENCOUNTER — TELEPHONE (OUTPATIENT)
Age: 19
End: 2024-06-26

## 2024-07-02 ENCOUNTER — OFFICE VISIT (OUTPATIENT)
Age: 19
End: 2024-07-02
Payer: COMMERCIAL

## 2024-07-02 VITALS
DIASTOLIC BLOOD PRESSURE: 70 MMHG | WEIGHT: 281.4 LBS | HEIGHT: 64 IN | BODY MASS INDEX: 48.04 KG/M2 | SYSTOLIC BLOOD PRESSURE: 110 MMHG | TEMPERATURE: 99 F | HEART RATE: 94 BPM

## 2024-07-02 DIAGNOSIS — G93.2 PSEUDOTUMOR CEREBRI: ICD-10-CM

## 2024-07-02 PROCEDURE — 99204 OFFICE O/P NEW MOD 45 MIN: CPT | Performed by: PHYSICIAN ASSISTANT

## 2024-07-02 NOTE — PROGRESS NOTES
Assessment/Plan:  Shasta was seen today for consult.    Diagnoses and all orders for this visit:    BMI 40.0-44.9, adult (HCC)  -     Semaglutide-Weight Management (WEGOVY) 0.25 MG/0.5ML; Inject 0.5 mL (0.25 mg total) under the skin once a week    Pseudotumor cerebri         - Discussed options of HealthyCORE-Intensive Lifestyle Intervention Program, Very Low Calorie Diet-VLCD, and Conservative Program and the role of weight loss medications.  - Explained the importance of making lifestyle changes first before starting anti-obesity medications.  - Patient should demonstrate lifestyle changes first before anti-obesity medication initiated.   - Patient is interested in pursuing Conservative Program  - Initial weight loss goal of 5-10% weight loss for improved health as studies have shown this is where we see the greatest impact on improving health and decreasing risk of obesity related conditions.  - Weight loss can improve patient's co-morbid conditions and/or prevent weight-related complications.  - Labs reviewed: As below.      General Recommendations:  Nutrition:  Eat breakfast daily.  Do not skip meals.     Food log (ie.) [email or web address] [email or web address], sparkpeople.com, loseit.com, calorieking.com, etc.    Practice mindful eating.  Be sure to set aside time to eat, eat slowly, and savor your food.    Hydration:    At least 64oz of water daily.  No sugar sweetened beverages.  No juice (eat the fruit instead).    Exercise:  Studies have shown that the ideal exercise goal is somewhere between 150 to 300 minutes of moderate intensity exercise a week.  Start with exercising 10 minutes every other day and gradually increase physical activity with a goal of at least 150 minutes of moderate intensity exercise a week, divided over at least 3 days a week.  An example of this would be exercising 30 minutes a day, 5 days a week.  Resistance training can increase muscle mass and increase our resting metabolic rate.   FULL BODY resistance  training is recommended 2-3 times a week.  Do not do this on consecutive days to allow for muscle recovery.    Aim for a bare minimum 5000 steps, even on days you do not exercise.    Monitoring:   Weigh yourself daily.  If this causes undue stress, then just weigh yourself once a week.  Weigh yourself the same time of the day with the same amount of clothing on.  Preferably this should be done after waking up, before you eat, and with no clothing or minimal clothing on.    Specific Goals:  No sugary beverages. At least 64oz of water daily.  Gradually increase physical activity to a goal of 5 days per week for 30 minutes of MODERATE intensity PLUS 2 days per week of FULL BODY resistance training  Goal protein intake of 60-80 grams per day    Calorie goal:  2000 sheila/day (Provided with meal plan to follow).    Return visit:    Calorie tracking/calorie deficit  Physical activity goals - walking + resistance trianing reviewed  AOM  Would avoid phentermine and topamax  Discussed contrave and GLP1 RA.   Patient interested in GLP1 RA.  - Patient denies personal history of pancreatitis. Patient also denies personal and family history of thyroid cancer and multiple endocrine neoplasia type 2 (MEN 2 tumor).   Wegovy reviewed  RTC in 4-5 months       WEGOVY:   I am sending the Wegovy to your pharmacy. This will start the prior authorization process. My office takes care of that. It can take up to 3 weeks to process.     Start Wegovy 0.25 mg subcutaneously once a week for 4 weeks, then increase to 0.5 mg subcutaneously each week. A few days after you take the first pen of the starting dose of 0.25 mg, please message me with an update regarding how you are feeling. As long as you are tolerating it well, I will submit the next dose of 0.5 mg to the pharmacy, as we will also likely need to do another prior authorization for that dose.     - Side effects of Wegovy: nausea, vomiting, diarrhea, and constipation. If severe abdominal  pain develops, stop Wegovy and go to the ER, as this could be pancreatitis. Monitor heart rate while on Wegovy and if resting heart rate greater than 100 beats per minute, patient will notify me.   - If you need to have surgery or another procedure, such as an upper endoscopy or colonoscopy, please contact my office as often medications like Wegovy need to be held for a certain amount of time prior to a procedure.       Total time spent reviewing chart, interviewing patient, examining patient, discussing plan, answering all questions, and documentin min.       ______________________________________________________________________        Subjective:   Chief Complaint   Patient presents with    Consult     Mwm consult       HPI: Shasta Kenney  is a 18 y.o. female with history of pseudotumor cerebri, oligomenorrhea, migraines and excess weight, here to pursue weight loss management.  Previous notes and records have been reviewed.    Pseudotumor cerebri - acetazolamide     TSH WNL  Fasting glucose WNL  Lipid panel WNL    Weight became an issue with pseudotumor cerebri ()diagnosis.   Gained about 30 lbs in the past year.     Previously tried keto diets.   Was previously doing many sports while in HS.     Calorie tracking with [employer] - currently [id number]    HPI  Wt Readings from Last 20 Encounters:   24 128 kg (281 lb 6.4 oz) (>99%, Z= 2.73)*   24 125 kg (274 lb 8 oz) (>99%, Z= 2.67)*   24 122 kg (269 lb 9.6 oz) (>99%, Z= 2.63)*   08/15/23 113 kg (249 lb 12.8 oz) (>99%, Z= 2.46)*   23 111 kg (245 lb 9.5 oz) (>99%, Z= 2.42)*   23 111 kg (243 lb 12.8 oz) (>99%, Z= 2.41)*   23 111 kg (245 lb 9.6 oz) (>99%, Z= 2.42)*   23 111 kg (245 lb 9.5 oz) (>99%, Z= 2.42)*   22 109 kg (240 lb) (>99%, Z= 2.38)*   10/18/22 108 kg (237 lb) (>99%, Z= 2.35)*   22 102 kg (225 lb 12.8 oz) (99%, Z= 2.26)*   22 95.1 kg (209 lb 10.5 oz) (98%, Z= 2.11)*   22 97.1 kg (214  lb) (98%, Z= 2.16)*   03/14/22 97.3 kg (214 lb 9.6 oz) (99%, Z= 2.17)*   11/19/21 91.6 kg (202 lb) (98%, Z= 2.05)*   03/30/21 84.4 kg (186 lb) (97%, Z= 1.86)*   01/25/21 86.2 kg (190 lb) (97%, Z= 1.94)*   01/15/21 89.8 kg (198 lb) (98%, Z= 2.05)*   01/14/21 89.8 kg (198 lb) (98%, Z= 2.05)*   01/11/21 90.2 kg (198 lb 12.8 oz) (98%, Z= 2.06)*     * Growth percentiles are based on [facility] (Girls, 2-20 Years) data.     Excess Weight:  Highest weight: current  Lowest Weight: unclear  Current weight: 281 lbs  Goal: 200 lbs  What has been tried: Diet and Exercise    Food logging: *lives at home with parents  B: skip  S: granola bar  L: left overs   S: cucumbers + cream cheese  D: wings. 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       Past Medical History:   Diagnosis Date    Osgood-Schlatter's disease, left      Patient denies personal and family history of  pancreatitis, thyroid cancer, MEN-2 tumors.  Denies any hx of glaucoma, seizures, kidney stones, gallstones.  Denies Hx of CAD, PAD, palpitations, arrhythmia.   Denies suicidal behavior or thinking , insomnia or sleep disturbance. + anxiety/depression currently not treated     No past surgical history on file.    The following portions of the patient's history were reviewed and updated as appropriate: allergies, current medications, past family history, past medical history, past social history, past surgical history, and problem list.    Review Of Systems:  Review of Systems   Constitutional:  Negative for fatigue and fever.   HENT:  Negative for sore throat, trouble swallowing and voice change.    Respiratory:  Negative for shortness of breath.   "  Cardiovascular:  Negative for chest pain.   Gastrointestinal:  Negative for abdominal pain, constipation, diarrhea, nausea and vomiting.   Endocrine: Negative for cold intolerance and heat intolerance.   Genitourinary:  Negative for difficulty urinating.   Musculoskeletal:  Negative for arthralgias and back pain.   Psychiatric/Behavioral:  Negative for suicidal ideas. The patient is not nervous/anxious.         + anxiety and depression - not currently on pharmacotherapy   All other systems reviewed and are negative.      Objective:  /70   Pulse 94   Temp 99 °F (37.2 °C) (Tympanic)   Ht 5' 3.58\" (1.615 m)   Wt 128 kg (281 lb 6.4 oz)   BMI 48.94 kg/m²   Physical Exam  Vitals and nursing note reviewed.   Constitutional:       General: She is not in acute distress.     Appearance: Normal appearance. She is obese. She is not ill-appearing, toxic-appearing or diaphoretic.   HENT:      Head: Normocephalic and atraumatic.   Eyes:      General:         Right eye: No discharge.         Left eye: No discharge.      Conjunctiva/sclera: Conjunctivae normal.   Pulmonary:      Effort: Pulmonary effort is normal. No respiratory distress.   Musculoskeletal:         General: Normal range of motion.      Cervical back: Normal range of motion. No rigidity.      Right lower leg: No edema.      Left lower leg: No edema.   Skin:     Coloration: Skin is not pale.      Findings: No erythema or rash.   Neurological:      General: No focal deficit present.      Mental Status: She is alert.   Psychiatric:         Mood and Affect: Mood normal.         Labs and Imaging  Recent labs and imaging have been personally reviewed.  Lab Results   Component Value Date    WBC 4.6 08/16/2023    HGB 14.1 08/16/2023    HCT 42.3 08/16/2023    MCV 87.6 08/16/2023     08/16/2023     Lab Results   Component Value Date    SODIUM 137 08/16/2023    K 4.1 08/16/2023     (H) 08/16/2023    CO2 17 (L) 08/16/2023    AGAP 10 01/15/2021    BUN 14 " "08/16/2023    CREATININE 0.73 08/16/2023    GLUC 86 08/16/2023    CALCIUM 9.6 08/16/2023    AST 21 08/16/2023    ALT 28 08/16/2023    ALKPHOS 116 08/16/2023    TP 7.6 08/16/2023    TBILI 0.7 08/16/2023    EGFR 122 08/16/2023     Lab Results   Component Value Date    HGBA1C 4.7 03/25/2022     No results found for: \"[id number]\", \"TSH\"  Lab Results   Component Value Date    CHOLESTEROL 178 (H) 08/16/2023     Lab Results   Component Value Date    HDL 52 08/16/2023     Lab Results   Component Value Date    TRIG 85 08/16/2023     Lab Results   Component Value Date    LDLCALC 108 08/16/2023      "

## 2024-07-03 ENCOUNTER — TELEPHONE (OUTPATIENT)
Age: 19
End: 2024-07-03

## 2024-07-03 NOTE — TELEPHONE ENCOUNTER
PA for wegovy .25mg    Submitted via    [x]CMM-KEY [id number]  []Surescripts-Case ID #   []Faxed to plan   []Other website   []Phone call Case ID #     Office notes sent, clinical questions answered. Awaiting determination    Turnaround time for your insurance to make a decision on your Prior Authorization can take 7-21 business days.

## 2024-07-08 NOTE — TELEPHONE ENCOUNTER
Lm for pt regarding denial.      Coverage is provided for patients after a trial of behavioral modification and dietary restriction for at least 3 months.    What are the next steps.

## 2024-07-08 NOTE — TELEPHONE ENCOUNTER
PA for wegovy .25mg Denied    Reason:    We reviewed the information you provided in support of a request to obtain Wegovy 0.25mg/0.5 PEN INJCTR under your patient’s plan. We are unable to approve this request for the following reason(s): ? Coverage is provided for patients after a trial of behavioral modification and dietary restriction for at least 3 months. Coverage cannot be authorized at this time. We based this decision on the prior authorization criteria for: 08899 Weight Loss - GlucagonLike Peptide-1 Agonists Prior Authorization Policy We are providing you with a copy of the Notice of Adverse Benefit Determination that we sent to your patient. The appeal rights and procedures are explained in the notice. If you have any questions or wish to discuss this determination with a pharmacist or physician, please call us at [phone], 24 hours a day, 7 days a week    Denial letter scanned into Media Yes    Appeal started no ( Provider will need to decide if appeal is warranted and send clinical documentation to Prior Authorization Team for initiation.)    **Please follow up with your patient regarding denial and next steps**

## 2024-07-08 NOTE — TELEPHONE ENCOUNTER
If GLP1 RA not covered by insurance, can try contrave. Would need to caution alcohol intake    Wellbutrin  mg daily in the morning and in 2 weeks, as long as you are tolerating Wellbutrin well, start Naltrexone.     Naltrexone 50 mg tablet, take 1/2 tablet by mouth daily in the morning for 2 weeks, then take 1/2 tablet twice a day.     Potential side effects of Wellbutrin include: abdominal upset, headache, dizziness, trouble sleeping, increased blood pressure, depression/anxiety, and fatigue. Patient should call/return if he/she develops symptoms of depression/aniety. Patient should have ER evaluation if thoughts of harming self/others occur. Wellbutrin should be weaned rather than stopped abruptly.      Side effects of naltrexone: nausea, vomiting, diarrhea, constipation, headache, anxiety, and confusion. You may feel nauseated when consuming alcohol while taking naltrexone. Must be discontinued prior to surgery. Please notify me if you are scheduled for surgery, so that naltrexone can be stopped prior to surgery.

## 2024-07-09 ENCOUNTER — TELEPHONE (OUTPATIENT)
Dept: BARIATRICS | Facility: CLINIC | Age: 19
End: 2024-07-09

## 2024-08-14 ENCOUNTER — HOSPITAL ENCOUNTER (OUTPATIENT)
Facility: HOSPITAL | Age: 19
Discharge: HOME | End: 2024-08-14
Payer: COMMERCIAL

## 2024-08-14 DIAGNOSIS — R29.818 SUSPECTED SLEEP APNEA: ICD-10-CM

## 2024-08-14 PROCEDURE — G0399 HOME SLEEP TEST/TYPE 3 PORTA: HCPCS

## 2024-08-14 NOTE — PROGRESS NOTES
Home Sleep Study Documentation    HOME STUDY DEVICE: Noxturnal no                                           Yasmeen G3 yes device # 20      Pre-Sleep Home Study:    Set-up and instructions performed by: Marleni    Technician performed demonstration for Patient: yes    Return demonstration performed by Patient: yes    Written instructions provided to Patient: yes    Patient signed consent form: yes        Post-Sleep Home Study:    Additional comments by Patient: pending    Home Sleep Study Failed:pending    Failure reason: pending    Reported or Detected: pending    Scored by: pending

## 2024-08-19 PROBLEM — G47.33 OSA (OBSTRUCTIVE SLEEP APNEA): Status: ACTIVE | Noted: 2024-08-19

## 2024-09-11 ENCOUNTER — TELEPHONE (OUTPATIENT)
Dept: SLEEP CENTER | Facility: CLINIC | Age: 19
End: 2024-09-11

## 2024-09-11 NOTE — TELEPHONE ENCOUNTER
Sleep study resulted and does not show sleep apnea.     Patient read results on [employer].     Called patient and left message advising I will send a [employer] message with the sleep study results and next steps.  Provided sleep center number([phone]) to call if any questions.

## 2024-11-20 ENCOUNTER — TELEPHONE (OUTPATIENT)
Dept: BARIATRICS | Facility: CLINIC | Age: 19
End: 2024-11-20

## 2025-01-07 ENCOUNTER — TELEPHONE (OUTPATIENT)
Age: 20
End: 2025-01-07

## 2025-01-07 NOTE — TELEPHONE ENCOUNTER
Patient called rx refill line returning the office   Vm     Please call patient @ [phone] patient states any day is good for her

## 2025-01-07 NOTE — TELEPHONE ENCOUNTER
LM-pt to schedule f/u appt w/any provider this week or the next. Please bring updated insurance information to re-submit to new insurance.

## 2025-01-07 NOTE — TELEPHONE ENCOUNTER
Pt of Darya Villar calling in to inquire if wegovy is able to resubmitted to insurance now that she has new insurance as of 1/1/25.   Wegovy was denied by previous insurance.     Advised pt to call their current insurance carrier and ask about weight loss injectables such as wegovy and zepbound to see if they are covered with her plan.     Pt verbalized understanding and would like to know if she needs to come in for another appointment since it has been 6 months or if this can be reodered if it is covered.     Please advise.   Call back # [phone]

## 2025-01-15 ENCOUNTER — OFFICE VISIT (OUTPATIENT)
Age: 20
End: 2025-01-15
Payer: COMMERCIAL

## 2025-01-15 VITALS
DIASTOLIC BLOOD PRESSURE: 70 MMHG | HEART RATE: 85 BPM | SYSTOLIC BLOOD PRESSURE: 120 MMHG | BODY MASS INDEX: 48.45 KG/M2 | WEIGHT: 283.8 LBS | TEMPERATURE: 97.5 F | HEIGHT: 64 IN

## 2025-01-15 DIAGNOSIS — G93.2 PSEUDOTUMOR CEREBRI: ICD-10-CM

## 2025-01-15 DIAGNOSIS — E66.01 OBESITY, CLASS III, BMI 40-49.9 (MORBID OBESITY) (HCC): Primary | ICD-10-CM

## 2025-01-15 PROCEDURE — 99213 OFFICE O/P EST LOW 20 MIN: CPT | Performed by: PHYSICIAN ASSISTANT

## 2025-01-15 RX ORDER — TIRZEPATIDE 2.5 MG/.5ML
2.5 INJECTION, SOLUTION SUBCUTANEOUS WEEKLY
Qty: 2 ML | Refills: 0 | Status: SHIPPED | OUTPATIENT
Start: 2025-01-15 | End: 2025-02-12

## 2025-01-15 NOTE — PROGRESS NOTES
Assessment/Plan:  Shasta was seen today for follow-up.    Diagnoses and all orders for this visit:    Obesity, Class III, BMI 40-49.9 (morbid obesity) (HCC)  -     tirzepatide (Zepbound) 2.5 mg/0.5 mL auto-injector; Inject 0.5 mL (2.5 mg total) under the skin once a week for 28 days    Pseudotumor cerebri        - Weight not at goal  - Patient is interested in Conservative Program  - Labs reviewed: As below.    General Recommendations:  Nutrition:  Eat breakfast daily.  Do not skip meals.     Food log (ie.) www.Seesearch.com, sparkpeople.com, loseit.com, Bevy.com, etc.    Practice mindful eating.  Be sure to set aside time to eat, eat slowly, and savor your food.    Hydration:    At least 64oz of water daily.  No sugar sweetened beverages.  No juice (eat the fruit instead).    Exercise:  Studies have shown that the ideal exercise goal is somewhere between 150 to 300 minutes of moderate intensity exercise a week.  Start with exercising 10 minutes every other day and gradually increase physical activity with a goal of at least 150 minutes of moderate intensity exercise a week, divided over at least 3 days a week.  An example of this would be exercising 30 minutes a day, 5 days a week.  Resistance training can increase muscle mass and increase our resting metabolic rate.   FULL BODY resistance training is recommended 2-3 times a week.  Do not do this on consecutive days to allow for muscle recovery.    Aim for a bare minimum 5000 steps, even on days you do not exercise.    Monitoring:   Weigh yourself daily.  If this causes undue stress, then just weigh yourself once a week.  Weigh yourself the same time of the day with the same amount of clothing on.  Preferably this should be done after waking up, before you eat, and with no clothing or minimal clothing on.    Calorie goal:  2000 sheila/day (Provided with meal plan to follow).    Return visit:    Calorie tracking/calorie deficit  Physical activity goals - walking  + resistance trianing reviewed  AOM  Would avoid phentermine and topamax  Discussed contrave and GLP1 RA.   Patient interested in GLP1 RA.  - Patient denies personal history of pancreatitis. Patient also denies personal and family history of thyroid cancer and multiple endocrine neoplasia type 2 (MEN 2 tumor).   Trial of zepbound. Use and side effect profile discussed.   RTC in 4 months          ______________________________________________________________________        Subjective:   Chief Complaint   Patient presents with    Follow-up     Mwm 4-5mth f/u       HPI: Shasta Kenney  is a 19 y.o. female with history of pseudotumor cerebri, oligomenorrhea, migraines and excess weight, here to pursue weight loss management.  Previous notes and records have been reviewed.    Pseudotumor cerebri - acetazolamide     TSH WNL  Fasting glucose WNL  Lipid panel WNL    Weight became an issue with pseudotumor cerebri (2020)diagnosis.   Gained about 30 lbs in the past year.     Previously tried keto diets.   Was previously doing many sports while in HS.     Calorie tracking with Socialeyes App - currently [id number]    Prescribed wegovy 7/2024 however denied by insurance. Reports she has a new insurance.     Patient has actively been performing dietary and physical modifications for over 6 months without any weight loss.     Sleep study 5/2024 negative for sleep apnea    HPI  Wt Readings from Last 20 Encounters:   01/15/25 129 kg (283 lb 12.8 oz)   07/02/24 128 kg (281 lb 6.4 oz) (>99%, Z= 2.73)*   05/20/24 125 kg (274 lb 8 oz) (>99%, Z= 2.67)*   03/12/24 122 kg (269 lb 9.6 oz) (>99%, Z= 2.63)*   08/15/23 113 kg (249 lb 12.8 oz) (>99%, Z= 2.46)*   01/26/23 111 kg (245 lb 9.5 oz) (>99%, Z= 2.42)*   01/24/23 111 kg (243 lb 12.8 oz) (>99%, Z= 2.41)*   01/20/23 111 kg (245 lb 9.6 oz) (>99%, Z= 2.42)*   01/17/23 111 kg (245 lb 9.5 oz) (>99%, Z= 2.42)*   11/02/22 109 kg (240 lb) (>99%, Z= 2.38)*   10/18/22 108 kg (237 lb) (>99%, Z= 2.35)*    08/26/22 102 kg (225 lb 12.8 oz) (99%, Z= 2.26)*   04/22/22 95.1 kg (209 lb 10.5 oz) (98%, Z= 2.11)*   04/04/22 97.1 kg (214 lb) (98%, Z= 2.16)*   03/14/22 97.3 kg (214 lb 9.6 oz) (99%, Z= 2.17)*   11/19/21 91.6 kg (202 lb) (98%, Z= 2.05)*   03/30/21 84.4 kg (186 lb) (97%, Z= 1.86)*   01/25/21 86.2 kg (190 lb) (97%, Z= 1.94)*   01/15/21 89.8 kg (198 lb) (98%, Z= 2.05)*   01/14/21 89.8 kg (198 lb) (98%, Z= 2.05)*     * Growth percentiles are based on [facility] (Girls, 2-20 Years) data.     Excess Weight:  Highest weight: current  Lowest Weight: unclear  Current weight: 281 lbs  Goal: 200 lbs  What has been tried: Diet and Exercise    Food logging: *lives at home with parents  B: skip  S: granola bar  L: left overs   S: cucumbers + cream cheese  D: wings. 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       Past Medical History:   Diagnosis Date    Osgood-Schlatter's disease, left      Patient denies personal and family history of  pancreatitis, thyroid cancer, MEN-2 tumors.  Denies any hx of glaucoma, seizures, kidney stones, gallstones.  Denies Hx of CAD, PAD, palpitations, arrhythmia.   Denies suicidal behavior or thinking , insomnia or sleep disturbance. + anxiety/depression currently not treated     No past surgical history on file.    The following portions of the patient's history were reviewed and updated as appropriate: allergies, current medications, past family history, past medical history, past social history, past surgical history, and problem list.    Review Of Systems:  Review of Systems   Constitutional:  Negative for fatigue and fever.   HENT:  Negative for sore throat, trouble swallowing  "and voice change.    Respiratory:  Negative for shortness of breath.    Cardiovascular:  Negative for chest pain.   Gastrointestinal:  Negative for abdominal pain, constipation, diarrhea, nausea and vomiting.   Endocrine: Negative for cold intolerance and heat intolerance.   Genitourinary:  Negative for difficulty urinating.   Musculoskeletal:  Negative for arthralgias and back pain.   Psychiatric/Behavioral:  Negative for suicidal ideas. The patient is not nervous/anxious.         + anxiety and depression - not currently on pharmacotherapy   All other systems reviewed and are negative.      Objective:  /70 (Patient Position: Sitting, Cuff Size: Standard)   Pulse 85   Temp 97.5 °F (36.4 °C) (Tympanic)   Ht 5' 3.5\" (1.613 m)   Wt 129 kg (283 lb 12.8 oz)   BMI 49.48 kg/m²   Physical Exam  Vitals and nursing note reviewed.   Constitutional:       General: She is not in acute distress.     Appearance: Normal appearance. She is obese. She is not ill-appearing, toxic-appearing or diaphoretic.   HENT:      Head: Normocephalic and atraumatic.   Eyes:      General:         Right eye: No discharge.         Left eye: No discharge.      Conjunctiva/sclera: Conjunctivae normal.   Pulmonary:      Effort: Pulmonary effort is normal. No respiratory distress.   Musculoskeletal:         General: Normal range of motion.      Cervical back: Normal range of motion. No rigidity.      Right lower leg: No edema.      Left lower leg: No edema.   Skin:     Coloration: Skin is not pale.      Findings: No erythema or rash.   Neurological:      General: No focal deficit present.      Mental Status: She is alert.   Psychiatric:         Mood and Affect: Mood normal.         Labs and Imaging  Recent labs and imaging have been personally reviewed.  Lab Results   Component Value Date    WBC 4.6 08/16/2023    HGB 14.1 08/16/2023    HCT 42.3 08/16/2023    MCV 87.6 08/16/2023     08/16/2023     Lab Results   Component Value Date    " "SODIUM 137 08/16/2023    K 4.1 08/16/2023     (H) 08/16/2023    CO2 17 (L) 08/16/2023    AGAP 10 01/15/2021    BUN 14 08/16/2023    CREATININE 0.73 08/16/2023    GLUC 86 08/16/2023    CALCIUM 9.6 08/16/2023    AST 21 08/16/2023    ALT 28 08/16/2023    ALKPHOS 116 08/16/2023    TP 7.6 08/16/2023    TBILI 0.7 08/16/2023    EGFR 122 08/16/2023     Lab Results   Component Value Date    HGBA1C 4.7 03/25/2022     No results found for: \"[id number]\", \"TSH\"  Lab Results   Component Value Date    CHOLESTEROL 178 (H) 08/16/2023     Lab Results   Component Value Date    HDL 52 08/16/2023     Lab Results   Component Value Date    TRIG 85 08/16/2023     Lab Results   Component Value Date    LDLCALC 108 08/16/2023      "

## 2025-01-22 DIAGNOSIS — E66.01 OBESITY, CLASS III, BMI 40-49.9 (MORBID OBESITY) (HCC): ICD-10-CM

## 2025-01-23 RX ORDER — PHENTERMINE HYDROCHLORIDE 15 MG/1
CAPSULE ORAL
Refills: 0 | OUTPATIENT
Start: 2025-01-23

## 2025-01-29 ENCOUNTER — TELEPHONE (OUTPATIENT)
Dept: BARIATRICS | Facility: CLINIC | Age: 20
End: 2025-01-29

## 2025-01-30 NOTE — TELEPHONE ENCOUNTER
PA for Zepbound 2.5 mg SUBMITTED to [facility]    via    [x]CMM-KEY: [id number]  []Surescripts-Case ID #   []Availity-Auth ID # NDC #   []Faxed to plan   []Other website   []Phone call Case ID #     []PA sent as URGENT    All office notes, labs and other pertaining documents and studies sent. Clinical questions answered. Awaiting determination from insurance company.     Turnaround time for your insurance to make a decision on your Prior Authorization can take 7-21 business days.

## 2025-01-31 NOTE — TELEPHONE ENCOUNTER
PA for Zepbound 2.5 mg APPROVED     Date(s) approved 1/30/2025-7/29/2025    Case #    Patient advised by          []MyChart Message  []Phone call   [x]LMOM  []L/M to call office as no active Communication consent on file  []Unable to leave detailed message as VM not approved on Communication consent       Pharmacy advised by    []Fax  [x]Phone call    Approval letter scanned into Media Yes

## 2025-02-24 ENCOUNTER — TELEPHONE (OUTPATIENT)
Age: 20
End: 2025-02-24

## 2025-02-24 NOTE — TELEPHONE ENCOUNTER
Appointment canceled for Shasta Nuding ([id number])  Visit type: URGENT FOLLOW UP [facility]  2/26/2025 8:30 AM (30 minutes) with Justina Arredondo MD in [facility]    Reason for cancellation: Canceled via Walk-in          Hello,      Called patient in attempt reschedule her canceled appt w/Dr Arredondo.  Someone answered and call disconnected. Sent [employer] message.    Thank you

## 2025-04-03 ENCOUNTER — TELEPHONE (OUTPATIENT)
Age: 20
End: 2025-04-03

## 2025-04-03 ENCOUNTER — APPOINTMENT (EMERGENCY)
Dept: CT IMAGING | Facility: HOSPITAL | Age: 20
End: 2025-04-03
Payer: COMMERCIAL

## 2025-04-03 ENCOUNTER — OFFICE VISIT (OUTPATIENT)
Dept: URGENT CARE | Facility: CLINIC | Age: 20
End: 2025-04-03
Payer: COMMERCIAL

## 2025-04-03 ENCOUNTER — HOSPITAL ENCOUNTER (EMERGENCY)
Facility: HOSPITAL | Age: 20
Discharge: HOME | End: 2025-04-03
Attending: EMERGENCY MEDICINE
Payer: COMMERCIAL

## 2025-04-03 VITALS
HEIGHT: 64 IN | DIASTOLIC BLOOD PRESSURE: 61 MMHG | TEMPERATURE: 97.4 F | SYSTOLIC BLOOD PRESSURE: 101 MMHG | OXYGEN SATURATION: 100 % | BODY MASS INDEX: 48.32 KG/M2 | RESPIRATION RATE: 18 BRPM | HEART RATE: 72 BPM | WEIGHT: 283 LBS

## 2025-04-03 VITALS
OXYGEN SATURATION: 98 % | HEIGHT: 64 IN | WEIGHT: 283 LBS | HEART RATE: 85 BPM | RESPIRATION RATE: 16 BRPM | BODY MASS INDEX: 48.32 KG/M2 | TEMPERATURE: 98.5 F | SYSTOLIC BLOOD PRESSURE: 116 MMHG | DIASTOLIC BLOOD PRESSURE: 78 MMHG

## 2025-04-03 DIAGNOSIS — R11.2 NAUSEA VOMITING AND DIARRHEA: Primary | ICD-10-CM

## 2025-04-03 DIAGNOSIS — R19.7 NAUSEA VOMITING AND DIARRHEA: Primary | ICD-10-CM

## 2025-04-03 DIAGNOSIS — M54.50 MIDLINE LOW BACK PAIN, UNSPECIFIED CHRONICITY, UNSPECIFIED WHETHER SCIATICA PRESENT: ICD-10-CM

## 2025-04-03 DIAGNOSIS — B34.9 VIRAL SYNDROME: Primary | ICD-10-CM

## 2025-04-03 DIAGNOSIS — R10.11 RIGHT UPPER QUADRANT ABDOMINAL PAIN: ICD-10-CM

## 2025-04-03 DIAGNOSIS — M54.9 BACK PAIN: ICD-10-CM

## 2025-04-03 DIAGNOSIS — K52.9 GASTROENTERITIS: ICD-10-CM

## 2025-04-03 LAB
ALBUMIN SERPL BCG-MCNC: 4.6 G/DL (ref 3.5–5)
ALP SERPL-CCNC: 83 U/L (ref 34–104)
ALT SERPL W P-5'-P-CCNC: 34 U/L (ref 7–52)
AMPHETAMINES SERPL QL SCN: NEGATIVE
ANION GAP SERPL CALCULATED.3IONS-SCNC: 6 MMOL/L (ref 4–13)
AST SERPL W P-5'-P-CCNC: 20 U/L (ref 13–39)
BACTERIA UR QL AUTO: ABNORMAL /HPF
BARBITURATES UR QL: NEGATIVE
BASOPHILS # BLD AUTO: 0.02 THOUSANDS/ÂΜL (ref 0–0.1)
BASOPHILS NFR BLD AUTO: 0 % (ref 0–1)
BENZODIAZ UR QL: NEGATIVE
BILIRUB SERPL-MCNC: 0.75 MG/DL (ref 0.2–1)
BILIRUB UR QL STRIP: NEGATIVE
BUN SERPL-MCNC: 12 MG/DL (ref 5–25)
CALCIUM SERPL-MCNC: 9.3 MG/DL (ref 8.4–10.2)
CHLORIDE SERPL-SCNC: 115 MMOL/L (ref 96–108)
CLARITY UR: ABNORMAL
CO2 SERPL-SCNC: 14 MMOL/L (ref 21–32)
COCAINE UR QL: NEGATIVE
COLOR UR: YELLOW
CREAT SERPL-MCNC: 0.76 MG/DL (ref 0.6–1.3)
EOSINOPHIL # BLD AUTO: 0.05 THOUSAND/ÂΜL (ref 0–0.61)
EOSINOPHIL NFR BLD AUTO: 1 % (ref 0–6)
ERYTHROCYTE [DISTWIDTH] IN BLOOD BY AUTOMATED COUNT: 13.3 % (ref 11.6–15.1)
EXT PREGNANCY TEST URINE: NEGATIVE
EXT. CONTROL: NORMAL
FENTANYL UR QL SCN: NEGATIVE
FLUAV RNA RESP QL NAA+PROBE: NEGATIVE
FLUBV RNA RESP QL NAA+PROBE: NEGATIVE
GFR SERPL CREATININE-BSD FRML MDRD: 113 ML/MIN/1.73SQ M
GLUCOSE SERPL-MCNC: 91 MG/DL (ref 65–140)
GLUCOSE UR STRIP-MCNC: NEGATIVE MG/DL
HCT VFR BLD AUTO: 42.9 % (ref 34.8–46.1)
HGB BLD-MCNC: 14.3 G/DL (ref 11.5–15.4)
HGB UR QL STRIP.AUTO: NEGATIVE
HYDROCODONE UR QL SCN: NEGATIVE
IMM GRANULOCYTES # BLD AUTO: 0.03 THOUSAND/UL (ref 0–0.2)
IMM GRANULOCYTES NFR BLD AUTO: 0 % (ref 0–2)
KETONES UR STRIP-MCNC: NEGATIVE MG/DL
LEUKOCYTE ESTERASE UR QL STRIP: ABNORMAL
LIPASE SERPL-CCNC: 24 U/L (ref 11–82)
LYMPHOCYTES # BLD AUTO: 1.55 THOUSANDS/ÂΜL (ref 0.6–4.47)
LYMPHOCYTES NFR BLD AUTO: 20 % (ref 14–44)
MAGNESIUM SERPL-MCNC: 2 MG/DL (ref 1.9–2.7)
MCH RBC QN AUTO: 29.2 PG (ref 26.8–34.3)
MCHC RBC AUTO-ENTMCNC: 33.3 G/DL (ref 31.4–37.4)
MCV RBC AUTO: 88 FL (ref 82–98)
METHADONE UR QL: NEGATIVE
MONOCYTES # BLD AUTO: 0.45 THOUSAND/ÂΜL (ref 0.17–1.22)
MONOCYTES NFR BLD AUTO: 6 % (ref 4–12)
MUCOUS THREADS UR QL AUTO: ABNORMAL
NEUTROPHILS # BLD AUTO: 5.57 THOUSANDS/ÂΜL (ref 1.85–7.62)
NEUTS SEG NFR BLD AUTO: 73 % (ref 43–75)
NITRITE UR QL STRIP: NEGATIVE
NON-SQ EPI CELLS URNS QL MICRO: ABNORMAL /HPF
NRBC BLD AUTO-RTO: 0 /100 WBCS
OPIATES UR QL SCN: NEGATIVE
OXYCODONE+OXYMORPHONE UR QL SCN: NEGATIVE
PCP UR QL: NEGATIVE
PH UR STRIP.AUTO: 5.5 [PH]
PLATELET # BLD AUTO: 320 THOUSANDS/UL (ref 149–390)
PMV BLD AUTO: 9.3 FL (ref 8.9–12.7)
POTASSIUM SERPL-SCNC: 3.8 MMOL/L (ref 3.5–5.3)
PROT SERPL-MCNC: 7.6 G/DL (ref 6.4–8.4)
PROT UR STRIP-MCNC: ABNORMAL MG/DL
RBC # BLD AUTO: 4.9 MILLION/UL (ref 3.81–5.12)
RBC #/AREA URNS AUTO: ABNORMAL /HPF
RSV RNA RESP QL NAA+PROBE: NEGATIVE
SARS-COV-2 RNA RESP QL NAA+PROBE: NEGATIVE
SODIUM SERPL-SCNC: 135 MMOL/L (ref 135–147)
SP GR UR STRIP.AUTO: >=1.03 (ref 1–1.03)
THC UR QL: NEGATIVE
UROBILINOGEN UR STRIP-ACNC: <2 MG/DL
WBC # BLD AUTO: 7.67 THOUSAND/UL (ref 4.31–10.16)
WBC #/AREA URNS AUTO: ABNORMAL /HPF

## 2025-04-03 PROCEDURE — 96375 TX/PRO/DX INJ NEW DRUG ADDON: CPT

## 2025-04-03 PROCEDURE — 83735 ASSAY OF MAGNESIUM: CPT | Performed by: EMERGENCY MEDICINE

## 2025-04-03 PROCEDURE — 96367 TX/PROPH/DG ADDL SEQ IV INF: CPT

## 2025-04-03 PROCEDURE — 81025 URINE PREGNANCY TEST: CPT | Performed by: EMERGENCY MEDICINE

## 2025-04-03 PROCEDURE — 99284 EMERGENCY DEPT VISIT MOD MDM: CPT | Performed by: EMERGENCY MEDICINE

## 2025-04-03 PROCEDURE — 99213 OFFICE O/P EST LOW 20 MIN: CPT | Performed by: PHYSICIAN ASSISTANT

## 2025-04-03 PROCEDURE — 85025 COMPLETE CBC W/AUTO DIFF WBC: CPT | Performed by: EMERGENCY MEDICINE

## 2025-04-03 PROCEDURE — 99284 EMERGENCY DEPT VISIT MOD MDM: CPT

## 2025-04-03 PROCEDURE — 87086 URINE CULTURE/COLONY COUNT: CPT | Performed by: EMERGENCY MEDICINE

## 2025-04-03 PROCEDURE — 80053 COMPREHEN METABOLIC PANEL: CPT | Performed by: EMERGENCY MEDICINE

## 2025-04-03 PROCEDURE — 83690 ASSAY OF LIPASE: CPT | Performed by: EMERGENCY MEDICINE

## 2025-04-03 PROCEDURE — 0241U HB NFCT DS VIR RESP RNA 4 TRGT: CPT | Performed by: EMERGENCY MEDICINE

## 2025-04-03 PROCEDURE — 80307 DRUG TEST PRSMV CHEM ANLYZR: CPT | Performed by: EMERGENCY MEDICINE

## 2025-04-03 PROCEDURE — 36415 COLL VENOUS BLD VENIPUNCTURE: CPT

## 2025-04-03 PROCEDURE — 96365 THER/PROPH/DIAG IV INF INIT: CPT

## 2025-04-03 PROCEDURE — 74177 CT ABD & PELVIS W/CONTRAST: CPT

## 2025-04-03 PROCEDURE — 81001 URINALYSIS AUTO W/SCOPE: CPT | Performed by: EMERGENCY MEDICINE

## 2025-04-03 RX ORDER — LIDOCAINE 50 MG/G
2 PATCH TOPICAL ONCE
Status: DISCONTINUED | OUTPATIENT
Start: 2025-04-03 | End: 2025-04-03 | Stop reason: HOSPADM

## 2025-04-03 RX ORDER — MAGNESIUM SULFATE HEPTAHYDRATE 40 MG/ML
2 INJECTION, SOLUTION INTRAVENOUS ONCE
Status: COMPLETED | OUTPATIENT
Start: 2025-04-03 | End: 2025-04-03

## 2025-04-03 RX ORDER — LIDOCAINE 50 MG/G
2 PATCH TOPICAL DAILY
Qty: 30 PATCH | Refills: 0 | Status: SHIPPED | OUTPATIENT
Start: 2025-04-03

## 2025-04-03 RX ORDER — SENNOSIDES 8.6 MG
650 CAPSULE ORAL EVERY 8 HOURS PRN
Qty: 30 TABLET | Refills: 0 | Status: SHIPPED | OUTPATIENT
Start: 2025-04-03

## 2025-04-03 RX ORDER — ONDANSETRON 2 MG/ML
4 INJECTION INTRAMUSCULAR; INTRAVENOUS ONCE
Status: COMPLETED | OUTPATIENT
Start: 2025-04-03 | End: 2025-04-03

## 2025-04-03 RX ORDER — DICYCLOMINE HCL 20 MG
20 TABLET ORAL EVERY 6 HOURS PRN
Qty: 30 TABLET | Refills: 0 | Status: SHIPPED | OUTPATIENT
Start: 2025-04-03

## 2025-04-03 RX ORDER — ONDANSETRON 4 MG/1
4 TABLET, ORALLY DISINTEGRATING ORAL EVERY 6 HOURS PRN
Qty: 12 TABLET | Refills: 0 | Status: SHIPPED | OUTPATIENT
Start: 2025-04-03

## 2025-04-03 RX ORDER — DICYCLOMINE HCL 20 MG
20 TABLET ORAL ONCE
Status: COMPLETED | OUTPATIENT
Start: 2025-04-03 | End: 2025-04-03

## 2025-04-03 RX ORDER — ACETAMINOPHEN 10 MG/ML
1000 INJECTION, SOLUTION INTRAVENOUS ONCE
Status: COMPLETED | OUTPATIENT
Start: 2025-04-03 | End: 2025-04-03

## 2025-04-03 RX ADMIN — ACETAMINOPHEN 1000 MG: 10 INJECTION INTRAVENOUS at 17:35

## 2025-04-03 RX ADMIN — SODIUM CHLORIDE 1000 ML: 0.9 INJECTION, SOLUTION INTRAVENOUS at 17:36

## 2025-04-03 RX ADMIN — IOHEXOL 100 ML: 350 INJECTION, SOLUTION INTRAVENOUS at 17:55

## 2025-04-03 RX ADMIN — ONDANSETRON 4 MG: 2 INJECTION INTRAMUSCULAR; INTRAVENOUS at 17:35

## 2025-04-03 RX ADMIN — LIDOCAINE 5% 2 PATCH: 700 PATCH TOPICAL at 20:02

## 2025-04-03 RX ADMIN — MAGNESIUM SULFATE HEPTAHYDRATE 2 G: 40 INJECTION, SOLUTION INTRAVENOUS at 18:29

## 2025-04-03 RX ADMIN — DICYCLOMINE HYDROCHLORIDE 20 MG: 20 TABLET ORAL at 17:35

## 2025-04-03 NOTE — PROGRESS NOTES
"[facility]        NAME: Shasta Kenney is a 19 y.o. female  : 2005    MRN: [id number]  DATE: April 3, 2025  TIME: 2:03 PM    /78   Pulse 85   Temp 98.5 °F (36.9 °C)   Resp 16   Ht 5' 4\" (1.626 m)   Wt 128 kg (283 lb)   SpO2 98%   BMI 48.58 kg/m²     Assessment and Plan   Nausea vomiting and diarrhea [R11.2, R19.7]  1. Nausea vomiting and diarrhea  Transfer to other facility      2. Right upper quadrant abdominal pain  Transfer to other facility      3. Midline low back pain, unspecified chronicity, unspecified whether sciatica present  Transfer to other facility            Patient Instructions       Follow up with PCP in 3-5 days.  Proceed to  ER if symptoms worsen.    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 No meds for pain.    Vomiting     Pt reports yesterday she woke up with vomiting and diarrhea. No vomiting/diarrhea today. Reports continued nausea. Eating/drinking normally. Needs a school note.          History of Present Illness       Pt with 5 days nausea vomiting and some diarrhea, pt also with back pain  constant not intermittent     Back Pain  This is a new problem. The current episode started in the past 7 days. The problem occurs constantly. The problem has been waxing and waning since onset. The pain is present in the lumbar spine and sacro-iliac. The quality of the pain is described as aching and shooting. The pain does not radiate. The pain is at a severity of 6/10. The pain is The same all the time. The symptoms are aggravated by bending, position, sitting, standing and twisting. Stiffness is present All day. Associated symptoms include abdominal pain and headaches. Pertinent negatives include no bladder incontinence, bowel incontinence, chest pain, dysuria, fever, leg pain, numbness, paresis, " paresthesias, pelvic pain, perianal numbness, tingling, weakness or weight loss.   Vomiting   Associated symptoms include abdominal pain and headaches. Pertinent negatives include no chest pain, fever or weight loss.       Review of Systems   Review of Systems   Constitutional: Negative.  Negative for fever and weight loss.   HENT: Negative.     Eyes: Negative.    Respiratory: Negative.     Cardiovascular: Negative.  Negative for chest pain.   Gastrointestinal:  Positive for abdominal pain and vomiting. Negative for bowel incontinence.   Endocrine: Negative.    Genitourinary: Negative.  Negative for bladder incontinence, dysuria and pelvic pain.   Musculoskeletal:  Positive for back pain.   Skin: Negative.    Allergic/Immunologic: Negative.    Neurological:  Positive for headaches. Negative for tingling, weakness, numbness and paresthesias.   Hematological: Negative.    Psychiatric/Behavioral: Negative.     All other systems reviewed and are negative.        Current Medications       Current Outpatient Medications:     acetaZOLAMIDE (DIAMOX) 500 mg capsule, Take 1 capsule (500 mg total) by mouth 2 (two) times a day, Disp: 180 capsule, Rfl: 4    fluticasone (FLONASE) 50 mcg/act nasal spray, 2 sprays into each nostril daily (Patient not taking: Reported on 4/3/2025), Disp: 16 g, Rfl: 0    ondansetron (ZOFRAN) 4 mg tablet, Take 1 tablet (4 mg total) by mouth every 6 (six) hours as needed for nausea or vomiting (Patient not taking: Reported on 1/26/2023), Disp: 12 tablet, Rfl: 0    Current Allergies     Allergies as of 04/03/2025    (No Known Allergies)            The following portions of the patient's history were reviewed and updated as appropriate: allergies, current medications, past family history, past medical history, past social history, past surgical history and problem list.     Past Medical History:   Diagnosis Date    Osgood-Schlatter's disease, left        History reviewed. No pertinent surgical  "history.    Family History   Problem Relation Age of Onset    Diabetes Mother     Hypertension Mother     No Known Problems Father     Migraines Neg Hx     Seizures Neg Hx          Medications have been verified.        Objective   /78   Pulse 85   Temp 98.5 °F (36.9 °C)   Resp 16   Ht 5' 4\" (1.626 m)   Wt 128 kg (283 lb)   SpO2 98%   BMI 48.58 kg/m²        Physical Exam     Physical Exam  Vitals and nursing note reviewed.   Constitutional:       Appearance: Normal appearance. She is normal weight.      Comments: Discussed with pt with day 5  mid epigastric and ruq and back pain to go to er for further eval blood eval possible u/s  ct scan for gb or ulcer or pancrease problem   pt will go to er now  declines zofran rx and observ    HENT:      Head: Normocephalic and atraumatic.      Right Ear: Tympanic membrane, ear canal and external ear normal.      Left Ear: Tympanic membrane, ear canal and external ear normal.   Cardiovascular:      Rate and Rhythm: Regular rhythm.   Pulmonary:      Effort: Pulmonary effort is normal.      Breath sounds: Normal breath sounds.   Abdominal:      General: Bowel sounds are normal.      Palpations: Abdomen is soft.      Tenderness: There is abdominal tenderness.      Comments: Mid epigastric pain  some ruq pain   , pt with back pain increase with both midepigatric and ruq pain  minor fang sign    Musculoskeletal:      Cervical back: Normal range of motion and neck supple.   Neurological:      Mental Status: She is alert and oriented to person, place, and time.                     "

## 2025-04-03 NOTE — TELEPHONE ENCOUNTER
Patient calling as she just left urgent care for back pain x5 days that has progressed to vomiting, pain on palpation of area of pancreas and was advised to go to ED. Patient would like advice if she should go. Advised as she was evaluated by [facility] provider and recommended to go to ED she should absolutely follow this advice. VIJAYA

## 2025-04-03 NOTE — ED PROVIDER NOTES
Time reflects when diagnosis was documented in both MDM as applicable and the Disposition within this note       Time User Action Codes Description Comment    4/3/2025  7:52 PM Jesusita Fish Add [B34.9] Viral syndrome     4/3/2025  7:52 PM Jesusita Fish Add [K52.9] Gastroenteritis     4/3/2025  7:52 PM Jesusita Fish Add [M54.9] Back pain           ED Disposition       ED Disposition   Discharge    Condition   Stable    Date/Time   u Apr 3, 2025  7:52 PM    Comment   Shasta Nuding discharge to home/self care.                   Assessment & Plan       Medical Decision Making  Obtain blood work, UA, viral swab, CT abdomen/pelvis  Give IV fluids, antiemetics, pain medication continue to monitor patient for any worsening symptoms    Lab and radiology results were unremarkable.  Patient started to feel better with medications given in the ED.  At this time patient symptoms are consistent with viral syndrome.  UA showed concern for UTI however patient was not symptomatic.  Patient opted to wait for urine culture before treatment.  At this time supportive care was discussed.  Patient discharged home on Bentyl and Zofran for abdominal symptoms.  Patient given lidocaine patch and Tylenol for back pain which may be musculoskeletal in origin.  Patient discharged with follow-up to PCP.'Close return instructions given to return to the ER for any worsening symptoms.  Patient agrees with discharge plan.  Patient well appearing at time of discharge.    Please Note: Fluency Direct voice recognition software may have been used in the creation of this document. Wrong words or sound a like substitutions may have occurred due to the inherent limitations of the voice software.             Amount and/or Complexity of Data Reviewed  External Data Reviewed: labs and radiology.  Labs: ordered. Decision-making details documented in ED Course.  Radiology: ordered. Decision-making details documented in ED Course.    Risk  OTC  drugs.  Prescription drug management.        ED Course as of 04/03/25 2019   Thu Apr 03, 2025 1949 Leukocytes, UA(!): Large   1951 WBC, UA(!): 4-10   1951 Bacteria, UA(!): Innumerable  Patient is not having any urinary symptoms.  Patient opted to wait for urine culture to start any treatments.       Medications   lidocaine (LIDODERM) 5 % patch 2 patch (2 patches Topical Medication Applied 4/3/25 2002)   sodium chloride 0.9 % bolus 1,000 mL (0 mL Intravenous Stopped 4/3/25 1836)   ondansetron (ZOFRAN) injection 4 mg (4 mg Intravenous Given 4/3/25 1735)   acetaminophen (Ofirmev) injection 1,000 mg (0 mg Intravenous Stopped 4/3/25 1820)   dicyclomine (BENTYL) tablet 20 mg (20 mg Oral Given 4/3/25 1735)   magnesium sulfate 2 g/50 mL IVPB (premix) 2 g (0 g Intravenous Stopped 4/3/25 1929)   iohexol (OMNIPAQUE) 350 MG/ML injection (MULTI-DOSE) 100 mL (100 mL Intravenous Given 4/3/25 1755)       ED Risk Strat Scores                                                History of Present Illness       Chief Complaint   Patient presents with    Abdominal Pain     Started yesterday with vomiting, c/o lower back pain as well       Past Medical History:   Diagnosis Date    Osgood-Schlatter's disease, left       History reviewed. No pertinent surgical history.   Family History   Problem Relation Age of Onset    Diabetes Mother     Hypertension Mother     No Known Problems Father     Migraines Neg Hx     Seizures Neg Hx       Social History     Tobacco Use    Smoking status: Never     Passive exposure: Current    Smokeless tobacco: Never   Vaping Use    Vaping status: Every Day    Substances: Nicotine   Substance Use Topics    Alcohol use: No    Drug use: No      E-Cigarette/Vaping    E-Cigarette Use Current Every Day User       E-Cigarette/Vaping Substances    Nicotine Yes     THC No     CBD No     Flavoring No     Other No     Unknown No       I have reviewed and agree with the history as documented.     19-year-old female with  past history of morbid obesity, Osgood-Schlatter disease, presents to the ED for evaluation of low back pain along with nausea and vomiting since yesterday.  Patient went to a local urgent care who did an abdominal exam and patient had some epigastric abdominal pain.  Patient sent to the ED for ruling out any gallbladder pathology.  Patient works as a CNA.  Patient was doing some heavy patient lifting at work.  Patient started to have low back pain.  Patient went to urgent care for her low back pain.  Patient denies any saddle anesthesia.  Patient denies any bowel/bladder retention or incontinence.  Patient denies any numbness or weakness of lower extremity.  Patient states that she has pain in the low back that goes across.  Patient continues to have some nausea in the emergency department however patient is not vomiting.  No other sick contacts noted.      History provided by:  Patient  Abdominal Pain  Associated symptoms: nausea and vomiting    Associated symptoms: no chest pain, no chills, no cough, no dysuria, no fever, no hematuria, no shortness of breath and no sore throat        Review of Systems   Constitutional:  Negative for chills and fever.   HENT:  Negative for ear pain and sore throat.    Eyes:  Negative for pain and visual disturbance.   Respiratory:  Negative for cough and shortness of breath.    Cardiovascular:  Negative for chest pain and palpitations.   Gastrointestinal:  Positive for abdominal pain, nausea and vomiting.   Genitourinary:  Negative for dysuria and hematuria.   Musculoskeletal:  Positive for back pain. Negative for arthralgias.   Skin:  Negative for color change and rash.   Neurological:  Negative for seizures and syncope.   All other systems reviewed and are negative.          Objective       ED Triage Vitals [04/03/25 1507]   Temperature Pulse Blood Pressure Respirations SpO2 Patient Position - Orthostatic VS   (!) 97.4 °F (36.3 °C) 74 146/75 16 99 % Sitting      Temp Source  Heart Rate Source BP Location FiO2 (%) Pain Score    Temporal Monitor Left arm -- 6      Vitals      Date and Time Temp Pulse SpO2 Resp BP Pain Score FACES Pain Rating User   04/03/25 2003 -- 72 100 % 18 101/61 -- -- RN   04/03/25 1831 -- 61 100 % 16 95/54 -- -- RN   04/03/25 1507 97.4 °F (36.3 °C) 74 99 % 16 146/75 6 -- MS            Physical Exam  Vitals and nursing note reviewed.   Constitutional:       General: She is not in acute distress.     Appearance: She is well-developed.   HENT:      Head: Normocephalic and atraumatic.   Eyes:      Conjunctiva/sclera: Conjunctivae normal.   Cardiovascular:      Rate and Rhythm: Normal rate and regular rhythm.      Heart sounds: No murmur heard.  Pulmonary:      Effort: Pulmonary effort is normal. No respiratory distress.      Breath sounds: Normal breath sounds.   Abdominal:      Palpations: Abdomen is soft.      Tenderness: There is abdominal tenderness in the epigastric area.   Musculoskeletal:         General: No swelling.      Cervical back: Neck supple.      Comments: Tenderness to palpation noted across lower back in the lower lumbar region.  No CVA tenderness noted bilaterally   Skin:     General: Skin is warm and dry.      Capillary Refill: Capillary refill takes less than 2 seconds.   Neurological:      Mental Status: She is alert.   Psychiatric:         Mood and Affect: Mood normal.         Results Reviewed       Procedure Component Value Units Date/Time    FLU/RSV/COVID - if FLU/RSV clinically relevant (2hr TAT) [[id number]]  (Normal) Collected: 04/03/25 1739    Lab Status: Final result Specimen: Nares from Nose Updated: 04/03/25 1837     SARS-CoV-2 Negative     INFLUENZA A PCR Negative     INFLUENZA B PCR Negative     RSV PCR Negative    Narrative:      This test has been performed using the CoV-2/Flu/RSV plus assay on the [employer] GeneXpert platform. This test has been validated by the  and verified by the performing laboratory.     This test is  designed to amplify and detect the following: nucleocapsid (N), envelope (E), and RNA-dependent RNA polymerase (RdRP) genes of the SARS-CoV-2 genome; matrix (M), basic polymerase (PB2), and acidic protein (PA) segments of the influenza A genome; matrix (M) and non-structural protein (NS) segments of the influenza B genome, and the nucleocapsid genes of RSV A and RSV B.     Positive results are indicative of the presence of Flu A, Flu B, RSV, and/or SARS-CoV-2 RNA. Positive results for SARS-CoV-2 or suspected novel influenza should be reported to state, local, or federal health departments according to local reporting requirements.      All results should be assessed in conjunction with clinical presentation and other laboratory markers for clinical management.     FOR PEDIATRIC PATIENTS - copy/paste COVID Guidelines URL to browser: [email or web address]       Urine culture [[id number]] Collected: 04/03/25 1704    Lab Status: In process Specimen: Urine, Clean Catch Updated: 04/03/25 1735    Urine Microscopic [[id number]]  (Abnormal) Collected: 04/03/25 1704    Lab Status: Final result Specimen: Urine, Clean Catch Updated: 04/03/25 1727     RBC, UA None Seen /hpf      WBC, UA 4-10 /hpf      Epithelial Cells Innumerable /hpf      Bacteria, UA Innumerable /hpf      MUCUS THREADS Moderate    UA w Reflex to Microscopic w Reflex to Culture [[id number]]  (Abnormal) Collected: 04/03/25 1704    Lab Status: Final result Specimen: Urine, Clean Catch Updated: 04/03/25 1727     Color, UA Yellow     Clarity, UA Slightly Cloudy     Specific Gravity, UA >=1.030     pH, UA 5.5     Leukocytes, UA Large     Nitrite, UA Negative     Protein, UA Trace mg/dl      Glucose, UA Negative mg/dl      Ketones, UA Negative mg/dl      Urobilinogen, UA <2.0 mg/dl      Bilirubin, UA Negative     Occult Blood, UA Negative    Rapid drug screen, urine [[id number]]  (Normal) Collected: 04/03/25 1704     Lab Status: Final result Specimen: Urine, Clean Catch Updated: 04/03/25 1721     Amph/Meth UR Negative     Barbiturate Ur Negative     Benzodiazepine Urine Negative     Cocaine Urine Negative     Methadone Urine Negative     Opiate Urine Negative     PCP Ur Negative     THC Urine Negative     Oxycodone Urine Negative     Fentanyl Urine Negative     HYDROCODONE URINE Negative    Narrative:      FOR MEDICAL PURPOSES ONLY.   IF CONFIRMATION NEEDED PLEASE CONTACT THE LAB WITHIN 5 DAYS.    Drug Screen Cutoff Levels:  AMPHETAMINE/METHAMPHETAMINES  1000 ng/mL  BARBITURATES     200 ng/mL  BENZODIAZEPINES     200 ng/mL  COCAINE      300 ng/mL  METHADONE      300 ng/mL  OPIATES      300 ng/mL  PHENCYCLIDINE     25 ng/mL  THC       50 ng/mL  OXYCODONE      100 ng/mL  FENTANYL      5 ng/mL  HYDROCODONE     300 ng/mL    Magnesium [[id number]]  (Normal) Collected: 04/03/25 1515    Lab Status: Final result Specimen: Blood from Hand, Left Updated: 04/03/25 1715     Magnesium 2.0 mg/dL     POCT pregnancy, urine [[id number]]  (Normal) Collected: 04/03/25 1705    Lab Status: Final result Specimen: Urine Updated: 04/03/25 1705     EXT Preg Test, Ur Negative     Control Valid    Comprehensive metabolic panel [[id number]]  (Abnormal) Collected: 04/03/25 1515    Lab Status: Final result Specimen: Blood from Hand, Left Updated: 04/03/25 1535     Sodium 135 mmol/L      Potassium 3.8 mmol/L      Chloride 115 mmol/L      CO2 14 mmol/L      ANION GAP 6 mmol/L      BUN 12 mg/dL      Creatinine 0.76 mg/dL      Glucose 91 mg/dL      Calcium 9.3 mg/dL      AST 20 U/L      ALT 34 U/L      Alkaline Phosphatase 83 U/L      Total Protein 7.6 g/dL      Albumin 4.6 g/dL      Total Bilirubin 0.75 mg/dL      eGFR 113 ml/min/1.73sq m     Narrative:      [employer] guidelines for Chronic Kidney Disease (CKD):     Stage 1 with normal or high GFR (GFR > 90 mL/min/1.73 square meters)    Stage 2 Mild CKD (GFR = 60-89 mL/min/1.73 square  meters)    Stage 3A Moderate CKD (GFR = 45-59 mL/min/1.73 square meters)    Stage 3B Moderate CKD (GFR = 30-44 mL/min/1.73 square meters)    Stage 4 Severe CKD (GFR = 15-29 mL/min/1.73 square meters)    Stage 5 End Stage CKD (GFR <15 mL/min/1.73 square meters)  Note: GFR calculation is accurate only with a steady state creatinine    Lipase [[id number]]  (Normal) Collected: 04/03/25 1515    Lab Status: Final result Specimen: Blood from Hand, Left Updated: 04/03/25 1535     Lipase 24 u/L     CBC and differential [[id number]] Collected: 04/03/25 1515    Lab Status: Final result Specimen: Blood from Hand, Left Updated: 04/03/25 1521     WBC 7.67 Thousand/uL      RBC 4.90 Million/uL      Hemoglobin 14.3 g/dL      Hematocrit 42.9 %      MCV 88 fL      MCH 29.2 pg      MCHC 33.3 g/dL      RDW 13.3 %      MPV 9.3 fL      Platelets 320 Thousands/uL      nRBC 0 /100 WBCs      Segmented % 73 %      Immature Grans % 0 %      Lymphocytes % 20 %      Monocytes % 6 %      Eosinophils Relative 1 %      Basophils Relative 0 %      Absolute Neutrophils 5.57 Thousands/µL      Absolute Immature Grans 0.03 Thousand/uL      Absolute Lymphocytes 1.55 Thousands/µL      Absolute Monocytes 0.45 Thousand/µL      Eosinophils Absolute 0.05 Thousand/µL      Basophils Absolute 0.02 Thousands/µL             CT abdomen pelvis with contrast   Final Interpretation by Mykel Joseph MD (04/03 1815)      No acute intra-abdominal or pelvic abnormality identified. Normal caliber appendix.      No acute fracture or evidence for traumatic malalignment in the lumbar spine. No significant bony canal stenosis or foraminal narrowing identified.         Workstation performed: [id number]         CT recon only lumbar spine   Final Interpretation by Mykel Joseph MD (04/03 1815)      No acute intra-abdominal or pelvic abnormality identified. Normal caliber appendix.      No acute fracture or evidence for traumatic malalignment in the lumbar spine. No significant bony canal  stenosis or foraminal narrowing identified.         Workstation performed: [id number]             Procedures    ED Medication and Procedure Management   Prior to Admission Medications   Prescriptions Last Dose Informant Patient Reported? Taking?   acetaZOLAMIDE (DIAMOX) 500 mg capsule   No No   Sig: Take 1 capsule (500 mg total) by mouth 2 (two) times a day   fluticasone (FLONASE) 50 mcg/act nasal spray  Self No No   Si sprays into each nostril daily   Patient not taking: Reported on 4/3/2025   ondansetron (ZOFRAN) 4 mg tablet  Self No No   Sig: Take 1 tablet (4 mg total) by mouth every 6 (six) hours as needed for nausea or vomiting   Patient not taking: Reported on 2023      Facility-Administered Medications: None     Discharge Medication List as of 4/3/2025  7:53 PM        START taking these medications    Details   acetaminophen (TYLENOL) 650 mg CR tablet Take 1 tablet (650 mg total) by mouth every 8 (eight) hours as needed for mild pain, Starting Thu 4/3/2025, Normal      dicyclomine (BENTYL) 20 mg tablet Take 1 tablet (20 mg total) by mouth every 6 (six) hours as needed (abd pain), Starting Thu 4/3/2025, Normal      lidocaine (Lidoderm) 5 % Apply 2 patches topically over 12 hours daily Remove & Discard patch within 12 hours or as directed by MD, Starting Thu 4/3/2025, Normal      ondansetron (ZOFRAN-ODT) 4 mg disintegrating tablet Take 1 tablet (4 mg total) by mouth every 6 (six) hours as needed for nausea or vomiting, Starting Thu 4/3/2025, Normal           CONTINUE these medications which have NOT CHANGED    Details   acetaZOLAMIDE (DIAMOX) 500 mg capsule Take 1 capsule (500 mg total) by mouth 2 (two) times a day, Starting 2024, Normal      fluticasone (FLONASE) 50 mcg/act nasal spray 2 sprays into each nostril daily, Starting Tue 10/18/2022, Normal      ondansetron (ZOFRAN) 4 mg tablet Take 1 tablet (4 mg total) by mouth every 6 (six) hours as needed for nausea or vomiting, Starting Tue  1/17/2023, Normal           No discharge procedures on file.  ED SEPSIS DOCUMENTATION   Time reflects when diagnosis was documented in both MDM as applicable and the Disposition within this note       Time User Action Codes Description Comment    4/3/2025  7:52 PM Jesusita Fish [B34.9] Viral syndrome     4/3/2025  7:52 PM Jesusita Fish [K52.9] Gastroenteritis     4/3/2025  7:52 PM Jesusita Fish [M54.9] Back pain                  Jesusita Fish DO  04/03/25 2019

## 2025-04-05 LAB — BACTERIA UR CULT: NORMAL

## 2025-04-08 ENCOUNTER — OFFICE VISIT (OUTPATIENT)
Dept: URGENT CARE | Facility: CLINIC | Age: 20
End: 2025-04-08
Payer: COMMERCIAL

## 2025-04-08 ENCOUNTER — HOSPITAL ENCOUNTER (EMERGENCY)
Facility: HOSPITAL | Age: 20
Discharge: HOME | End: 2025-04-08
Attending: EMERGENCY MEDICINE
Payer: COMMERCIAL

## 2025-04-08 ENCOUNTER — APPOINTMENT (EMERGENCY)
Dept: RADIOLOGY | Facility: HOSPITAL | Age: 20
End: 2025-04-08
Payer: COMMERCIAL

## 2025-04-08 ENCOUNTER — APPOINTMENT (EMERGENCY)
Dept: ULTRASOUND IMAGING | Facility: HOSPITAL | Age: 20
End: 2025-04-08
Payer: COMMERCIAL

## 2025-04-08 VITALS
RESPIRATION RATE: 18 BRPM | HEART RATE: 89 BPM | OXYGEN SATURATION: 97 % | SYSTOLIC BLOOD PRESSURE: 137 MMHG | DIASTOLIC BLOOD PRESSURE: 86 MMHG | TEMPERATURE: 97.3 F

## 2025-04-08 VITALS
SYSTOLIC BLOOD PRESSURE: 130 MMHG | TEMPERATURE: 97.3 F | BODY MASS INDEX: 48.32 KG/M2 | HEART RATE: 90 BPM | RESPIRATION RATE: 16 BRPM | WEIGHT: 283 LBS | HEIGHT: 64 IN | OXYGEN SATURATION: 98 % | DIASTOLIC BLOOD PRESSURE: 84 MMHG

## 2025-04-08 DIAGNOSIS — R11.0 NAUSEA: ICD-10-CM

## 2025-04-08 DIAGNOSIS — K62.5 RECTAL BLEEDING: ICD-10-CM

## 2025-04-08 DIAGNOSIS — K29.70 GASTRITIS: Primary | ICD-10-CM

## 2025-04-08 DIAGNOSIS — R10.32 LLQ ABDOMINAL PAIN: Primary | ICD-10-CM

## 2025-04-08 LAB
ALBUMIN SERPL BCG-MCNC: 5 G/DL (ref 3.5–5)
ALP SERPL-CCNC: 84 U/L (ref 34–104)
ALT SERPL W P-5'-P-CCNC: 39 U/L (ref 7–52)
ANION GAP SERPL CALCULATED.3IONS-SCNC: 7 MMOL/L (ref 4–13)
APTT PPP: 28 SECONDS (ref 23–34)
AST SERPL W P-5'-P-CCNC: 23 U/L (ref 13–39)
BASOPHILS # BLD AUTO: 0.02 THOUSANDS/ÂΜL (ref 0–0.1)
BASOPHILS NFR BLD AUTO: 1 % (ref 0–1)
BILIRUB SERPL-MCNC: 1.02 MG/DL (ref 0.2–1)
BILIRUB UR QL STRIP: NEGATIVE
BUN SERPL-MCNC: 16 MG/DL (ref 5–25)
CALCIUM SERPL-MCNC: 9.8 MG/DL (ref 8.4–10.2)
CHLORIDE SERPL-SCNC: 107 MMOL/L (ref 96–108)
CLARITY UR: NORMAL
CO2 SERPL-SCNC: 21 MMOL/L (ref 21–32)
COLOR UR: YELLOW
CREAT SERPL-MCNC: 0.74 MG/DL (ref 0.6–1.3)
EOSINOPHIL # BLD AUTO: 0.14 THOUSAND/ÂΜL (ref 0–0.61)
EOSINOPHIL NFR BLD AUTO: 3 % (ref 0–6)
ERYTHROCYTE [DISTWIDTH] IN BLOOD BY AUTOMATED COUNT: 13.2 % (ref 11.6–15.1)
EXT PREGNANCY TEST URINE: NEGATIVE
EXT. CONTROL: NORMAL
GFR SERPL CREATININE-BSD FRML MDRD: 116 ML/MIN/1.73SQ M
GLUCOSE SERPL-MCNC: 86 MG/DL (ref 65–140)
GLUCOSE UR STRIP-MCNC: NEGATIVE MG/DL
HCT VFR BLD AUTO: 45.5 % (ref 34.8–46.1)
HGB BLD-MCNC: 14.8 G/DL (ref 11.5–15.4)
HGB UR QL STRIP.AUTO: NEGATIVE
IMM GRANULOCYTES # BLD AUTO: 0.01 THOUSAND/UL (ref 0–0.2)
IMM GRANULOCYTES NFR BLD AUTO: 0 % (ref 0–2)
INR PPP: 1 (ref 0.85–1.19)
KETONES UR STRIP-MCNC: NEGATIVE MG/DL
LEUKOCYTE ESTERASE UR QL STRIP: NEGATIVE
LIPASE SERPL-CCNC: 22 U/L (ref 11–82)
LYMPHOCYTES # BLD AUTO: 1.31 THOUSANDS/ÂΜL (ref 0.6–4.47)
LYMPHOCYTES NFR BLD AUTO: 30 % (ref 14–44)
MCH RBC QN AUTO: 28.5 PG (ref 26.8–34.3)
MCHC RBC AUTO-ENTMCNC: 32.5 G/DL (ref 31.4–37.4)
MCV RBC AUTO: 88 FL (ref 82–98)
MONOCYTES # BLD AUTO: 0.29 THOUSAND/ÂΜL (ref 0.17–1.22)
MONOCYTES NFR BLD AUTO: 7 % (ref 4–12)
NEUTROPHILS # BLD AUTO: 2.59 THOUSANDS/ÂΜL (ref 1.85–7.62)
NEUTS SEG NFR BLD AUTO: 59 % (ref 43–75)
NITRITE UR QL STRIP: NEGATIVE
NRBC BLD AUTO-RTO: 0 /100 WBCS
PH UR STRIP.AUTO: 5 [PH]
PLATELET # BLD AUTO: 324 THOUSANDS/UL (ref 149–390)
PMV BLD AUTO: 9.1 FL (ref 8.9–12.7)
POTASSIUM SERPL-SCNC: 4.2 MMOL/L (ref 3.5–5.3)
PROT SERPL-MCNC: 8 G/DL (ref 6.4–8.4)
PROT UR STRIP-MCNC: NEGATIVE MG/DL
PROTHROMBIN TIME: 13.7 SECONDS (ref 12.3–15)
RBC # BLD AUTO: 5.19 MILLION/UL (ref 3.81–5.12)
SL AMB  POCT GLUCOSE, UA: NEGATIVE
SL AMB LEUKOCYTE ESTERASE,UA: NEGATIVE
SL AMB POCT BILIRUBIN,UA: NEGATIVE
SL AMB POCT BLOOD,UA: ABNORMAL
SL AMB POCT CLARITY,UA: ABNORMAL
SL AMB POCT COLOR,UA: YELLOW
SL AMB POCT KETONES,UA: NEGATIVE
SL AMB POCT NITRITE,UA: NEGATIVE
SL AMB POCT PH,UA: 6.5
SL AMB POCT SPECIFIC GRAVITY,UA: 1.03
SL AMB POCT URINE PROTEIN: NEGATIVE
SL AMB POCT UROBILINOGEN: 0.2
SODIUM SERPL-SCNC: 135 MMOL/L (ref 135–147)
SP GR UR STRIP.AUTO: 1.02 (ref 1–1.03)
UROBILINOGEN UR STRIP-ACNC: <2 MG/DL
WBC # BLD AUTO: 4.36 THOUSAND/UL (ref 4.31–10.16)

## 2025-04-08 PROCEDURE — 85025 COMPLETE CBC W/AUTO DIFF WBC: CPT | Performed by: PHYSICIAN ASSISTANT

## 2025-04-08 PROCEDURE — 99285 EMERGENCY DEPT VISIT HI MDM: CPT | Performed by: PHYSICIAN ASSISTANT

## 2025-04-08 PROCEDURE — 36415 COLL VENOUS BLD VENIPUNCTURE: CPT | Performed by: PHYSICIAN ASSISTANT

## 2025-04-08 PROCEDURE — 76705 ECHO EXAM OF ABDOMEN: CPT

## 2025-04-08 PROCEDURE — 71045 X-RAY EXAM CHEST 1 VIEW: CPT

## 2025-04-08 PROCEDURE — 85730 THROMBOPLASTIN TIME PARTIAL: CPT | Performed by: PHYSICIAN ASSISTANT

## 2025-04-08 PROCEDURE — 99213 OFFICE O/P EST LOW 20 MIN: CPT | Performed by: PHYSICIAN ASSISTANT

## 2025-04-08 PROCEDURE — 96375 TX/PRO/DX INJ NEW DRUG ADDON: CPT

## 2025-04-08 PROCEDURE — 85610 PROTHROMBIN TIME: CPT | Performed by: PHYSICIAN ASSISTANT

## 2025-04-08 PROCEDURE — 99284 EMERGENCY DEPT VISIT MOD MDM: CPT

## 2025-04-08 PROCEDURE — 81003 URINALYSIS AUTO W/O SCOPE: CPT | Performed by: PHYSICIAN ASSISTANT

## 2025-04-08 PROCEDURE — 80053 COMPREHEN METABOLIC PANEL: CPT | Performed by: PHYSICIAN ASSISTANT

## 2025-04-08 PROCEDURE — 81002 URINALYSIS NONAUTO W/O SCOPE: CPT | Performed by: PHYSICIAN ASSISTANT

## 2025-04-08 PROCEDURE — 96361 HYDRATE IV INFUSION ADD-ON: CPT

## 2025-04-08 PROCEDURE — 83690 ASSAY OF LIPASE: CPT | Performed by: PHYSICIAN ASSISTANT

## 2025-04-08 PROCEDURE — 81025 URINE PREGNANCY TEST: CPT | Performed by: PHYSICIAN ASSISTANT

## 2025-04-08 PROCEDURE — 96374 THER/PROPH/DIAG INJ IV PUSH: CPT

## 2025-04-08 RX ORDER — SUCRALFATE 1 G/1
1 TABLET ORAL 4 TIMES DAILY
Qty: 20 TABLET | Refills: 0 | Status: SHIPPED | OUTPATIENT
Start: 2025-04-08 | End: 2025-04-13

## 2025-04-08 RX ORDER — ACETAMINOPHEN 10 MG/ML
1000 INJECTION, SOLUTION INTRAVENOUS ONCE
Status: COMPLETED | OUTPATIENT
Start: 2025-04-08 | End: 2025-04-08

## 2025-04-08 RX ORDER — ONDANSETRON 2 MG/ML
4 INJECTION INTRAMUSCULAR; INTRAVENOUS ONCE
Status: COMPLETED | OUTPATIENT
Start: 2025-04-08 | End: 2025-04-08

## 2025-04-08 RX ORDER — FAMOTIDINE 20 MG/1
20 TABLET, FILM COATED ORAL DAILY
Qty: 5 TABLET | Refills: 0 | Status: SHIPPED | OUTPATIENT
Start: 2025-04-08 | End: 2025-04-13

## 2025-04-08 RX ORDER — SUCRALFATE 1 G/1
1 TABLET ORAL ONCE
Status: COMPLETED | OUTPATIENT
Start: 2025-04-08 | End: 2025-04-08

## 2025-04-08 RX ORDER — FAMOTIDINE 10 MG/ML
20 INJECTION, SOLUTION INTRAVENOUS ONCE
Status: COMPLETED | OUTPATIENT
Start: 2025-04-08 | End: 2025-04-08

## 2025-04-08 RX ORDER — KETOROLAC TROMETHAMINE 30 MG/ML
15 INJECTION, SOLUTION INTRAMUSCULAR; INTRAVENOUS ONCE
Status: COMPLETED | OUTPATIENT
Start: 2025-04-08 | End: 2025-04-08

## 2025-04-08 RX ORDER — ONDANSETRON 4 MG/1
4 TABLET, FILM COATED ORAL EVERY 8 HOURS PRN
Qty: 9 TABLET | Refills: 0 | Status: SHIPPED | OUTPATIENT
Start: 2025-04-08 | End: 2025-04-11

## 2025-04-08 RX ADMIN — KETOROLAC TROMETHAMINE 15 MG: 30 INJECTION, SOLUTION INTRAMUSCULAR; INTRAVENOUS at 16:35

## 2025-04-08 RX ADMIN — SUCRALFATE 1 G: 1 TABLET ORAL at 16:35

## 2025-04-08 RX ADMIN — SODIUM CHLORIDE 1000 ML: 0.9 INJECTION, SOLUTION INTRAVENOUS at 15:15

## 2025-04-08 RX ADMIN — SODIUM CHLORIDE 1000 ML: 0.9 INJECTION, SOLUTION INTRAVENOUS at 16:38

## 2025-04-08 RX ADMIN — ACETAMINOPHEN 1000 MG: 1000 INJECTION, SOLUTION INTRAVENOUS at 15:15

## 2025-04-08 RX ADMIN — FAMOTIDINE 20 MG: 10 INJECTION, SOLUTION INTRAVENOUS at 16:35

## 2025-04-08 RX ADMIN — ONDANSETRON 4 MG: 2 INJECTION, SOLUTION INTRAMUSCULAR; INTRAVENOUS at 15:15

## 2025-04-08 NOTE — ED PROVIDER NOTES
"Time reflects when diagnosis was documented in both MDM as applicable and the Disposition within this note       Time User Action Codes Description Comment    4/8/2025  5:43 PM Milton Valente [K29.70] Gastritis     4/8/2025  5:43 PM Milton Valente [R11.0] Nausea           ED Disposition       ED Disposition   Discharge    Condition   Stable    Date/Time   Tue Apr 8, 2025  5:42 PM    Comment   Shasta Nuding discharge to home/self care.                   Assessment & Plan       Medical Decision Making  Patient is a 19-year-old female with no significant past medical or surgical history that presents to the emergency department with aching persistent worsening generalized abdominal pain, most severe upper abdomen for 2 weeks.   Patient hemodynamically stable and afebrile  No sirs  Negative urine pregnancy  U/A with no infection  Negative lipase  Electrolytes normal; kidney function normal  Normal transaminases, t.biliubin 1.02; normal alk-phos  No leukocytosis   Us gallbladder- impression- \"Impression: No acute sonographic abnormality to explain the patient's symptoms. Moderate hepatic steatosis.\"  Chest xray with nad  Recent ct abd/pelvis with no acute pathology identified  Delivered multimodal pain control and antiemetics in the emergency department; patient demonstrates decrease in presenting nausea and abdominal pain ED symptomatology status post medication delivery  Ddx likely and not limited to PUD, gastritis, acute pancreatitis, acute cholecystitis, pleurisy  Will treat for gastritis vs PUD  Pt was unable to provide stool sample throughout entire visit  Prescribed pepcid, zofran, carafate and counseled patient medication administration and side effects  Follow up with GI for nonemergent colonoscopy  Follow-up with PCP  Follow up with emergency department if symptoms persist or exacerbate  Patient demonstrates verbal understanding of all clinical laboratory and imaging findings, discharge instructions, " "follow-up, and verbally agrees with current treatment plan with teach back    *Due to voice recognition software, sound alike and misspelled words may be contained in the documentation*    Amount and/or Complexity of Data Reviewed  External Data Reviewed: radiology.     Details: 4/3/2025-CT ABDOMEN AND PELVIS WITH IV CONTRAST     \"INDICATION: low back pain. .     COMPARISON: None.     TECHNIQUE: CT examination of the abdomen and pelvis was performed. Multiplanar 2D reformatted images were created from the source data.     This examination, like all CT scans performed in the [facility], was performed utilizing techniques to minimize radiation dose exposure, including the use of iterative reconstruction and automated exposure control. Radiation dose length   product (DLP) for this visit: 1537 mGy-cm , 0 mGy-cm     IV Contrast: 100 mL of iohexol (OMNIPAQUE)  Enteric Contrast: Not administered.     FINDINGS:     ABDOMEN     LOWER CHEST: No clinically significant abnormality in the visualized lower chest.     LIVER/BILIARY TREE: Hepatic steatosis. No suspicious mass. Normal hepatic contours. No biliary dilation.     GALLBLADDER: No calcified gallstones. No pericholecystic inflammatory change.     SPLEEN: Unremarkable.     PANCREAS: Unremarkable.     ADRENAL GLANDS: Unremarkable.     KIDNEYS/URETERS: Unremarkable. No hydronephrosis.     STOMACH AND BOWEL: Unremarkable.     APPENDIX: No findings to suggest appendicitis.     ABDOMINOPELVIC CAVITY: No ascites. No pneumoperitoneum. No lymphadenopathy.     VESSELS: Unremarkable for patient's age.     PELVIS     REPRODUCTIVE ORGANS: There is an intrauterine device in place.     URINARY BLADDER: Unremarkable.     ABDOMINAL WALL/INGUINAL REGIONS: Unremarkable.     BONES: No acute fracture or suspicious osseous lesion. Reformatted images of the lumbar spine demonstrate no significant bony canal stenosis or foraminal narrowing. The vertebral body heights are " "maintained.     IMPRESSION:     No acute intra-abdominal or pelvic abnormality identified. Normal caliber appendix.     No acute fracture or evidence for traumatic malalignment in the lumbar spine. No significant bony canal stenosis or foraminal narrowing identified.        Workstation performed: [id number]\"       Labs: ordered. Decision-making details documented in ED Course.  Radiology: ordered and independent interpretation performed. Decision-making details documented in ED Course.    Risk  Prescription drug management.             Medications   sodium chloride 0.9 % bolus 1,000 mL (0 mL Intravenous Stopped 4/8/25 1615)   ketorolac (TORADOL) injection 15 mg (15 mg Intravenous Given 4/8/25 1635)   acetaminophen (Ofirmev) injection 1,000 mg (0 mg Intravenous Stopped 4/8/25 1530)   ondansetron (ZOFRAN) injection 4 mg (4 mg Intravenous Given 4/8/25 1515)   sucralfate (CARAFATE) tablet 1 g (1 g Oral Given 4/8/25 1635)   Famotidine (PF) (PEPCID) injection 20 mg (20 mg Intravenous Given 4/8/25 1635)   sodium chloride 0.9 % bolus 1,000 mL (0 mL Intravenous Stopped 4/8/25 1750)       ED Risk Strat Scores              CRAFFT      Flowsheet Row Most Recent Value   CRAFFT Initial Screen: During the past 12 months, did you:    1. Drink any alcohol (more than a few sips)?  No Filed at: 04/08/2025 1631   2. Smoke any marijuana or hashish No Filed at: 04/08/2025 1631   3. Use anything else to get high? (\"anything else\" includes illegal drugs, over the counter and prescription drugs, and things that you sniff or 'alaniz')? No Filed at: 04/08/2025 1631              No data recorded                            History of Present Illness       Chief Complaint   Patient presents with    Abdominal Pain     Pt returns for abd pain, n/v/d. Pt denies fevers. Pt has lower back pain. Pt coming from urgent care     Patient is a 19-year-old female with no significant past medical or surgical history that presents to the emergency department " with aching persistent worsening generalized abdominal pain, most severe upper abdomen for 2 weeks.  Patient also reports nausea vomiting and diarrhea symptoms beginning with the current ED presentation of generalized abdominal pain.  Patient was recently evaluated in the ED 4/3/2025; diagnosed with viral syndrome, gastroenteritis, back pain, with physician reporting lab and radiology results unremarkable, with urinalysis showing concern for possible UTI, with physician reporting patient not symptomatic and supportive care was discussed, patient was discharged home with Bentyl and Zofran for abdominal symptoms, lidocaine patch and Tylenol for back pain which physician further reports may be musculoskeletal in origin, with follow-up with PCP and return to ED precautions in place.  Patient denies palliative factors with provocative factors or after eating and pressure to upper abdomen.  Patient has not effective treatment.  Patient denies fevers, chills, constipation, urinary symptoms.  Patient has recent fall recent trauma.  Patient denies sick contacts recent travel.  Patient denies crushable diet or idem intake.  Patient with recent visit to urgent care care earlier today and was recommended to come to the ED for further evaluation of left lower quadrant abdominal pain for rectal bleeding and left lower quadrant abdominal pain.  Patient denies chest pain and shortness of breath.    Past Medical History:   Diagnosis Date    Osgood-Schlatter's disease, left       History reviewed. No pertinent surgical history.   Family History   Problem Relation Age of Onset    Diabetes Mother     Hypertension Mother     No Known Problems Father     Migraines Neg Hx     Seizures Neg Hx       Social History     Tobacco Use    Smoking status: Never     Passive exposure: Current    Smokeless tobacco: Never   Vaping Use    Vaping status: Every Day    Substances: Nicotine   Substance Use Topics    Alcohol use: No    Drug use: No       E-Cigarette/Vaping    E-Cigarette Use Current Every Day User       E-Cigarette/Vaping Substances    Nicotine Yes     THC No     CBD No     Flavoring No     Other No     Unknown No       I have reviewed and agree with the history as documented.       History provided by:  Patient   used: No    Abdominal Pain  Associated symptoms: diarrhea, nausea and vomiting    Associated symptoms: no chest pain, no chills, no constipation, no cough, no dysuria, no fever, no shortness of breath and no sore throat        Review of Systems   Constitutional:  Negative for activity change, appetite change, chills and fever.   HENT:  Negative for congestion, postnasal drip, rhinorrhea, sinus pressure, sinus pain, sore throat and tinnitus.    Eyes:  Negative for photophobia and visual disturbance.   Respiratory:  Negative for cough, chest tightness and shortness of breath.    Cardiovascular:  Negative for chest pain and palpitations.   Gastrointestinal:  Positive for abdominal pain, diarrhea, nausea and vomiting. Negative for constipation.   Genitourinary:  Negative for difficulty urinating, dysuria, flank pain, frequency and urgency.   Musculoskeletal:  Negative for back pain, gait problem, neck pain and neck stiffness.   Skin:  Negative for pallor and rash.   Allergic/Immunologic: Negative for environmental allergies and food allergies.   Neurological:  Negative for dizziness, weakness, numbness and headaches.   Psychiatric/Behavioral:  Negative for confusion.    All other systems reviewed and are negative.          Objective       ED Triage Vitals   Temperature Pulse Blood Pressure Respirations SpO2 Patient Position - Orthostatic VS   04/08/25 1419 04/08/25 1418 04/08/25 1418 04/08/25 1418 04/08/25 1418 04/08/25 1619   (!) 97.3 °F (36.3 °C) 85 128/77 16 98 % Sitting      Temp Source Heart Rate Source BP Location FiO2 (%) Pain Score    04/08/25 1419 04/08/25 1619 04/08/25 1619 -- 04/08/25 1635    Temporal Monitor  Right arm  4      Vitals      Date and Time Temp Pulse SpO2 Resp BP Pain Score FACES Pain Rating User   04/08/25 1706 -- -- -- -- -- 2 -- [facility]   04/08/25 1635 -- -- -- -- -- 4 -- [facility]   04/08/25 1619 -- 89 97 % 18 137/86 -- -- [facility]   04/08/25 1419 97.3 °F (36.3 °C) -- -- -- -- -- -- [facility]   04/08/25 1418 -- 85 98 % 16 128/77 -- -- [facility]            Physical Exam  Vitals and nursing note reviewed.   Constitutional:       General: She is awake.      Appearance: Normal appearance. She is well-developed and normal weight. She is not ill-appearing, toxic-appearing or diaphoretic.      Comments: /77   Pulse 85   Temp (!) 97.3 °F (36.3 °C) (Temporal)   Resp 16   SpO2 98%      HENT:      Head: Normocephalic and atraumatic.      Jaw: There is normal jaw occlusion.      Right Ear: Hearing, tympanic membrane and external ear normal. No decreased hearing noted. No drainage, swelling or tenderness. No mastoid tenderness.      Left Ear: Hearing, tympanic membrane and external ear normal. No decreased hearing noted. No drainage, swelling or tenderness. No mastoid tenderness.      Nose: Nose normal.      Mouth/Throat:      Lips: Pink.      Mouth: Mucous membranes are moist.      Pharynx: Oropharynx is clear. Uvula midline.   Eyes:      General: Lids are normal. Vision grossly intact. Gaze aligned appropriately.         Right eye: No discharge.         Left eye: No discharge.      Extraocular Movements: Extraocular movements intact.      Conjunctiva/sclera: Conjunctivae normal.      Pupils: Pupils are equal, round, and reactive to light.   Neck:      Vascular: No JVD.      Trachea: Trachea and phonation normal. No tracheal tenderness or tracheal deviation.   Cardiovascular:      Rate and Rhythm: Normal rate and regular rhythm.      Pulses: Normal pulses.           Radial pulses are 2+ on the right side and 2+ on the left side.        Posterior tibial pulses are 2+ on the right side and 2+ on the left side.      Heart sounds: Normal  heart sounds.   Pulmonary:      Effort: Pulmonary effort is normal.      Breath sounds: Normal breath sounds and air entry. No stridor. No decreased breath sounds, wheezing, rhonchi or rales.   Chest:      Chest wall: No tenderness.   Abdominal:      General: Abdomen is flat. Bowel sounds are normal. There is no distension.      Palpations: Abdomen is soft. Abdomen is not rigid.      Tenderness: There is abdominal tenderness in the right upper quadrant, epigastric area and left upper quadrant. There is no right CVA tenderness, left CVA tenderness, guarding or rebound.   Musculoskeletal:         General: Normal range of motion.      Cervical back: Full passive range of motion without pain, normal range of motion and neck supple. No rigidity. No spinous process tenderness or muscular tenderness. Normal range of motion.   Feet:      Right foot:      Toenail Condition: Right toenails are normal.      Left foot:      Toenail Condition: Left toenails are normal.   Lymphadenopathy:      Head:      Right side of head: No submental, submandibular, tonsillar, preauricular, posterior auricular or occipital adenopathy.      Left side of head: No submental, submandibular, tonsillar, preauricular, posterior auricular or occipital adenopathy.      Cervical: No cervical adenopathy.      Right cervical: No superficial, deep or posterior cervical adenopathy.     Left cervical: No superficial, deep or posterior cervical adenopathy.   Skin:     General: Skin is warm.      Capillary Refill: Capillary refill takes less than 2 seconds.      Findings: No rash.   Neurological:      General: No focal deficit present.      Mental Status: She is alert and oriented to person, place, and time. Mental status is at baseline.      GCS: GCS eye subscore is 4. GCS verbal subscore is 5. GCS motor subscore is 6.      Sensory: No sensory deficit.      Deep Tendon Reflexes: Reflexes are normal and symmetric.      Reflex Scores:       Patellar reflexes  are 2+ on the right side and 2+ on the left side.  Psychiatric:         Attention and Perception: Attention normal.         Mood and Affect: Mood normal.         Speech: Speech normal.         Behavior: Behavior normal. Behavior is cooperative.         Thought Content: Thought content normal.         Judgment: Judgment normal.         Results Reviewed       Procedure Component Value Units Date/Time    POCT pregnancy, urine [[id number]]  (Normal) Collected: 04/08/25 1619    Lab Status: Final result Updated: 04/08/25 1719     EXT Preg Test, Ur Negative     Control Valid    UA w Reflex to Microscopic w Reflex to Culture [[id number]] Collected: 04/08/25 1631    Lab Status: Final result Specimen: Urine, Clean Catch Updated: 04/08/25 1709     Color, UA Yellow     Clarity, UA Slightly Cloudy     Specific Gravity, UA 1.025     pH, UA 5.0     Leukocytes, UA Negative     Nitrite, UA Negative     Protein, UA Negative mg/dl      Glucose, UA Negative mg/dl      Ketones, UA Negative mg/dl      Urobilinogen, UA <2.0 mg/dl      Bilirubin, UA Negative     Occult Blood, UA Negative    Comprehensive metabolic panel [[id number]]  (Abnormal) Collected: 04/08/25 1513    Lab Status: Final result Specimen: Blood from Arm, Left Updated: 04/08/25 1532     Sodium 135 mmol/L      Potassium 4.2 mmol/L      Chloride 107 mmol/L      CO2 21 mmol/L      ANION GAP 7 mmol/L      BUN 16 mg/dL      Creatinine 0.74 mg/dL      Glucose 86 mg/dL      Calcium 9.8 mg/dL      AST 23 U/L      ALT 39 U/L      Alkaline Phosphatase 84 U/L      Total Protein 8.0 g/dL      Albumin 5.0 g/dL      Total Bilirubin 1.02 mg/dL      eGFR 116 ml/min/1.73sq m     Narrative:      National Kid[employer] guidelines for Chronic Kidney Disease (CKD):     Stage 1 with normal or high GFR (GFR > 90 mL/min/1.73 square meters)    Stage 2 Mild CKD (GFR = 60-89 mL/min/1.73 square meters)    Stage 3A Moderate CKD (GFR = 45-59 mL/min/1.73 square meters)    Stage 3B Moderate CKD  (GFR = 30-44 mL/min/1.73 square meters)    Stage 4 Severe CKD (GFR = 15-29 mL/min/1.73 square meters)    Stage 5 End Stage CKD (GFR <15 mL/min/1.73 square meters)  Note: GFR calculation is accurate only with a steady state creatinine    Lipase [[id number]]  (Normal) Collected: 04/08/25 1513    Lab Status: Final result Specimen: Blood from Arm, Left Updated: 04/08/25 1532     Lipase 22 u/L     Protime-INR [[id number]]  (Normal) Collected: 04/08/25 1513    Lab Status: Final result Specimen: Blood from Arm, Left Updated: 04/08/25 1531     Protime 13.7 seconds      INR 1.00    Narrative:      INR Therapeutic Range    Indication                                             INR Range      Atrial Fibrillation                                               2.0-3.0  Hypercoagulable State                                    2.0.2.3  Left Ventricular Asist Device                            2.0-3.0  Mechanical Heart Valve                                  -    Aortic(with afib, MI, embolism, HF, LA enlargement,    and/or coagulopathy)                                     2.0-3.0 (2.5-3.5)     Mitral                                                             2.5-3.5  Prosthetic/Bioprosthetic Heart Valve               2.0-3.0  Venous thromboembolism (VTE: VT, PE        2.0-3.0    APTT [[id number]]  (Normal) Collected: 04/08/25 1513    Lab Status: Final result Specimen: Blood from Arm, Left Updated: 04/08/25 1531     PTT 28 seconds     CBC and differential [[id number]]  (Abnormal) Collected: 04/08/25 1513    Lab Status: Final result Specimen: Blood from Arm, Left Updated: 04/08/25 1519     WBC 4.36 Thousand/uL      RBC 5.19 Million/uL      Hemoglobin 14.8 g/dL      Hematocrit 45.5 %      MCV 88 fL      MCH 28.5 pg      MCHC 32.5 g/dL      RDW 13.2 %      MPV 9.1 fL      Platelets 324 Thousands/uL      nRBC 0 /100 WBCs      Segmented % 59 %      Immature Grans % 0 %      Lymphocytes % 30 %      Monocytes % 7 %      Eosinophils Relative 3 %       Basophils Relative 1 %      Absolute Neutrophils 2.59 Thousands/µL      Absolute Immature Grans 0.01 Thousand/uL      Absolute Lymphocytes 1.31 Thousands/µL      Absolute Monocytes 0.29 Thousand/µL      Eosinophils Absolute 0.14 Thousand/µL      Basophils Absolute 0.02 Thousands/µL     Stool Enteric Bacterial Panel by PCR [[id number]]     Lab Status: No result Specimen: Stool from Rectum     Clostridioides difficile toxin by PCR with EIA [[id number]]     Lab Status: No result Specimen: Stool from Per Rectum             US right upper quadrant   ED Interpretation by Milton Valente PA-C (04/08 1318)   Reading Physician Reading Date Result Priority  Angelic Bartlett MD  [phone]    4/8/2025     Narrative & Impression  RIGHT UPPER QUADRANT ULTRASOUND     INDICATION: right upper quadrant abdominal tenderness; nausea and vomiting.     COMPARISON: CT abdomen pelvis 4/3/2025.     TECHNIQUE: Real-time ultrasound of the right upper quadrant was performed with a curvilinear transducer with both volumetric sweeps and still imaging techniques.     FINDINGS:     PANCREAS: Visualized portions of the pancreas are within normal limits.     AORTA AND IVC: Visualized portions are normal for patient age.     LIVER:  Size: Within normal range. The liver measures 17.0 cm in the midclavicular line.  Contour: Surface contour is smooth.  Parenchyma: There is moderate diffuse increased echogenicity with smooth echotexture and acoustic beam attenuation. Most consistent with moderate hepatic steatosis.  No liver mass identified.  Limited imaging of the main portal vein shows it to be patent and hepat   opetal.     BILIARY:  The gallbladder is normal in caliber.  No wall thickening or pericholecystic fluid.  No stones or sludge identified.  No sonographic Oquendo sign.  No intrahepatic biliary dilatation.  CBD measures 4.0 mm.  No choledocholithiasis.     KIDNEY:  Right kidney measures 10.5 x 5.5 x 6.2 cm. Volume 186.3 mL  Kidney within  normal limits.     ASCITES: None.     IMPRESSION:     No acute sonographic abnormality to explain the patient's symptoms.     Moderate hepatic steatosis.     Workstation performed: [id number]        Final Interpretation by Angelic Bartlett MD ([id number])      No acute sonographic abnormality to explain the patient's symptoms.      Moderate hepatic steatosis.      Workstation performed: [id number]         XR chest 1 view portable   ED Interpretation by Milton Valente PA-C ([id number])   Reading Physician Reading Date Result Priority  Cecile Gurrola MD  [phone]    2025     Narrative & Impression  XR CHEST PORTABLE     INDICATION: upper abdominal pain; nausea.     COMPARISON: None     FINDINGS:     Clear lungs. No pneumothorax or pleural effusion.     Normal cardiomediastinal silhouette.     Bones are unremarkable for age.     Normal upper abdomen.     IMPRESSION:     No acute consolidation. Hypoinflated lungs           Workstation performed: [id number]           Final Interpretation by Cecile Gurrola MD ([id number])      No acute consolidation. Hypoinflated lungs            Workstation performed: [id number]             Procedures    ED Medication and Procedure Management   Prior to Admission Medications   Prescriptions Last Dose Informant Patient Reported? Taking?   acetaZOLAMIDE (DIAMOX) 500 mg capsule   No No   Sig: Take 1 capsule (500 mg total) by mouth 2 (two) times a day   acetaminophen (TYLENOL) 650 mg CR tablet   No No   Sig: Take 1 tablet (650 mg total) by mouth every 8 (eight) hours as needed for mild pain   dicyclomine (BENTYL) 20 mg tablet   No No   Sig: Take 1 tablet (20 mg total) by mouth every 6 (six) hours as needed (abd pain)   Patient not taking: Reported on 2025   fluticasone (FLONASE) 50 mcg/act nasal spray  Self No No   Si sprays into each nostril daily   Patient not taking: Reported on 4/3/2025   lidocaine (Lidoderm) 5 %   No No   Sig: Apply 2 patches topically over 12  hours daily Remove & Discard patch within 12 hours or as directed by MD   ondansetron (ZOFRAN) 4 mg tablet  Self No No   Sig: Take 1 tablet (4 mg total) by mouth every 6 (six) hours as needed for nausea or vomiting   Patient not taking: Reported on 1/26/2023   ondansetron (ZOFRAN-ODT) 4 mg disintegrating tablet   No No   Sig: Take 1 tablet (4 mg total) by mouth every 6 (six) hours as needed for nausea or vomiting   Patient not taking: Reported on 4/8/2025      Facility-Administered Medications: None     Discharge Medication List as of 4/8/2025  5:46 PM        START taking these medications    Details   famotidine (PEPCID) 20 mg tablet Take 1 tablet (20 mg total) by mouth daily for 5 days, Starting Tue 4/8/2025, Until Sun 4/13/2025, Normal      sucralfate (CARAFATE) 1 g tablet Take 1 tablet (1 g total) by mouth 4 (four) times a day for 5 days, Starting Tue 4/8/2025, Until Sun 4/13/2025, Normal           CONTINUE these medications which have CHANGED    Details   ondansetron (ZOFRAN) 4 mg tablet Take 1 tablet (4 mg total) by mouth every 8 (eight) hours as needed for nausea or vomiting for up to 3 days, Starting Tue 4/8/2025, Until Fri 4/11/2025 at 2359, Normal           CONTINUE these medications which have NOT CHANGED    Details   acetaminophen (TYLENOL) 650 mg CR tablet Take 1 tablet (650 mg total) by mouth every 8 (eight) hours as needed for mild pain, Starting Thu 4/3/2025, Normal      acetaZOLAMIDE (DIAMOX) 500 mg capsule Take 1 capsule (500 mg total) by mouth 2 (two) times a day, Starting Mon 5/20/2024, Normal      dicyclomine (BENTYL) 20 mg tablet Take 1 tablet (20 mg total) by mouth every 6 (six) hours as needed (abd pain), Starting Thu 4/3/2025, Normal      fluticasone (FLONASE) 50 mcg/act nasal spray 2 sprays into each nostril daily, Starting Tue 10/18/2022, Normal      lidocaine (Lidoderm) 5 % Apply 2 patches topically over 12 hours daily Remove & Discard patch within 12 hours or as directed by MD, Starting  Thu 4/3/2025, Normal      ondansetron (ZOFRAN-ODT) 4 mg disintegrating tablet Take 1 tablet (4 mg total) by mouth every 6 (six) hours as needed for nausea or vomiting, Starting Thu 4/3/2025, Normal           No discharge procedures on file.  ED SEPSIS DOCUMENTATION   Time reflects when diagnosis was documented in both MDM as applicable and the Disposition within this note       Time User Action Codes Description Comment    4/8/2025  5:43 PM Milton Valente [K29.70] Gastritis     4/8/2025  5:43 PM Milton Valente [R11.0] Nausea                  Milton Valente PA-C  04/09/25 0100

## 2025-04-08 NOTE — PROGRESS NOTES
"  [facility]        NAME: Shasta Kenney is a 19 y.o. female  : 2005    MRN: [id number]  DATE: 2025  TIME: 1:59 PM    Assessment and Plan   LLQ abdominal pain [R10.32]  1. LLQ abdominal pain  POCT urine dip    Transfer to other facility      2. Rectal bleeding  Transfer to other facility            Patient Instructions       Follow up with PCP in 3-5 days.  Proceed to  ER if symptoms worsen.    If tests have been performed at [facility], our office will contact you with results if changes need to be made to the care plan discussed with you at the visit.  You can review your full results on [facility].    Chief Complaint     Chief Complaint   Patient presents with    Nausea     Pt reports general abdominal pain, nausea, and back pain with onset twelve days ago. Negative blood pressure test last Thursday. C/o lose stool 4xs in one hour last night. C/o rectal bleeding.          History of Present Illness       Patient presents with abdominal pain, nausea, vomiting, diarrhea, for the past 12 days.  States was seen in the ER last week with all normal results.  Stomach pain \"got really bad today\". There was some blood noted on underwear from rectum a few times the last 3 days. Abdominal pain located over the epigastric area more on the left side. Currently 6/10.   Denies black/tarry stools, fevers, chest pains, SOB, dyspnea.     Abdominal Pain  This is a new problem. The current episode started 1 to 4 weeks ago. The onset quality is undetermined. The problem occurs constantly. The most recent episode lasted 3 hours. The problem has been waxing and waning since onset. The pain is located in the LUQ and generalized abdominal region. The pain is at a severity of 6/10. The quality of the pain is described as aching, cramping and a sensation of fullness. Associated symptoms include anorexia, arthralgias, diarrhea, flatus, frequency, myalgias, nausea and vomiting. Pertinent negatives include no " belching, constipation, dysuria, fever, headaches, hematochezia, hematuria or melena. The symptoms are relieved by being still, certain positions and liquids.       Review of Systems   Review of Systems   Constitutional:  Negative for fever.   Gastrointestinal:  Positive for abdominal pain, anorexia, diarrhea, flatus, nausea and vomiting. Negative for constipation, hematochezia and melena.   Genitourinary:  Positive for frequency. Negative for dysuria and hematuria.   Musculoskeletal:  Positive for arthralgias and myalgias.   Neurological:  Negative for headaches.         Current Medications       Current Outpatient Medications:     acetaminophen (TYLENOL) 650 mg CR tablet, Take 1 tablet (650 mg total) by mouth every 8 (eight) hours as needed for mild pain, Disp: 30 tablet, Rfl: 0    acetaZOLAMIDE (DIAMOX) 500 mg capsule, Take 1 capsule (500 mg total) by mouth 2 (two) times a day, Disp: 180 capsule, Rfl: 4    lidocaine (Lidoderm) 5 %, Apply 2 patches topically over 12 hours daily Remove & Discard patch within 12 hours or as directed by MD, Disp: 30 patch, Rfl: 0    dicyclomine (BENTYL) 20 mg tablet, Take 1 tablet (20 mg total) by mouth every 6 (six) hours as needed (abd pain) (Patient not taking: Reported on 4/8/2025), Disp: 30 tablet, Rfl: 0    fluticasone (FLONASE) 50 mcg/act nasal spray, 2 sprays into each nostril daily (Patient not taking: Reported on 4/3/2025), Disp: 16 g, Rfl: 0    ondansetron (ZOFRAN) 4 mg tablet, Take 1 tablet (4 mg total) by mouth every 6 (six) hours as needed for nausea or vomiting (Patient not taking: Reported on 1/26/2023), Disp: 12 tablet, Rfl: 0    ondansetron (ZOFRAN-ODT) 4 mg disintegrating tablet, Take 1 tablet (4 mg total) by mouth every 6 (six) hours as needed for nausea or vomiting (Patient not taking: Reported on 4/8/2025), Disp: 12 tablet, Rfl: 0    Current Allergies     Allergies as of 04/08/2025    (No Known Allergies)            The following portions of the patient's  "history were reviewed and updated as appropriate: allergies, current medications, past family history, past medical history, past social history, past surgical history and problem list.     Past Medical History:   Diagnosis Date    Osgood-Schlatter's disease, left        No past surgical history on file.    Family History   Problem Relation Age of Onset    Diabetes Mother     Hypertension Mother     No Known Problems Father     Migraines Neg Hx     Seizures Neg Hx          Medications have been verified.        Objective   /84 (BP Location: Right arm, Patient Position: Sitting, Cuff Size: Standard)   Pulse 90   Temp (!) 97.3 °F (36.3 °C) (Tympanic)   Resp 16   Ht 5' 4\" (1.626 m)   Wt 128 kg (283 lb)   SpO2 98%   BMI 48.58 kg/m²   No LMP recorded. Patient has had an implant.       Physical Exam     Physical Exam  Constitutional:       General: She is not in acute distress.     Appearance: Normal appearance. She is not ill-appearing.   Abdominal:      General: Abdomen is flat. Bowel sounds are normal.      Palpations: Abdomen is soft.      Tenderness: There is abdominal tenderness (generalized but worst in LLQ). There is no guarding or rebound.   Neurological:      Mental Status: She is alert.   Psychiatric:         Mood and Affect: Mood normal.         Behavior: Behavior normal.                   "

## 2025-04-08 NOTE — DISCHARGE INSTRUCTIONS
Patient Education     Gastritis Discharge Instructions   About this topic   Gastritis is inflammation of the lining of the stomach. Sometimes gastritis is caused by bacteria. Other times, it can be caused by drugs. Some types of drugs can cause gastritis. The most common are nonsteroidal anti-inflammatory drugs (NSAIDs) like ibuprofen or naproxen. Gastritis can also be caused by other things like drinking alcohol or having a serious illness. It can also happen if you have a health problem in which the body’s own infection fighting system attacks the stomach lining. Based on the cause, you may need to take an antibiotic or other medicine to treat your gastritis. If so, be sure you finish all of the medicine that is ordered.     What care is needed at home?   Ask your doctor what you need to do when you go home. Make sure you ask questions if you do not understand what the doctor says.  Eat small meals more often to help with belly pain.  Keep a diary about your pain and the foods you eat. Then you can avoid those that bother your stomach.  Avoid or limit spicy foods.  Avoid or limit beer, wine, and mixed drinks.  If you smoke, try to quit. Your doctor or nurse can help.  Try to learn ways to manage stress. Stress may cause the acid levels in your stomach to rise.  If possible, avoid long-term use of aspirin and other anti-inflammatory drugs.  What follow-up care is needed?   Your doctor may ask you to make visits to the office to check on your progress. Be sure to keep these visits.  What drugs may be needed?   The doctor may order drugs to:  Fight an infection  Control how much acid your stomach makes  Help healing  Will physical activity be limited?   You may want to limit your activity if you have belly pain. Having an upset stomach or throwing up may also limit what you do. You may need more rest if you feel weak or tired.  What problems could happen?   Stomach ulcer or bleeding  Stomach cancer  When do I need to  call the doctor?   You start throwing up blood or pass a lot of blood in your stool.  Your belly pain becomes much worse all of a sudden or over a few hours.  Your belly becomes hard or tender.  You have chest pain or trouble breathing  Your stools are bright red, black, or tar-colored.  You are throwing up often.  Your belly pain does not get better even after taking medicine, changing your diet, and following treatment instructions.  You lose a lot of weight without trying.  Teach Back: Helping You Understand   The Teach Back Method helps you understand the information we are giving you. After you talk with the staff, tell them in your own words what you learned. This helps to make sure the staff has described each thing clearly. It also helps to explain things that may have been confusing. Before going home, make sure you can do these:  I can tell you about my condition.  I can tell you if I need to make changes with my diet or drugs.  I can tell you what I will do if I throw up blood or have bloody or black tarry stools.  Last Reviewed Date   2021-06-08  Consumer Information Use and Disclaimer   This generalized information is a limited summary of diagnosis, treatment, and/or medication information. It is not meant to be comprehensive and should be used as a tool to help the user understand and/or assess potential diagnostic and treatment options. It does NOT include all information about conditions, treatments, medications, side effects, or risks that may apply to a specific patient. It is not intended to be medical advice or a substitute for the medical advice, diagnosis, or treatment of a health care provider based on the health care provider's examination and assessment of a patient’s specific and unique circumstances. Patients must speak with a health care provider for complete information about their health, medical questions, and treatment options, including any risks or benefits regarding use of  medications. This information does not endorse any treatments or medications as safe, effective, or approved for treating a specific patient. UpToDate, Inc[employer] and its affiliates disclaim any warranty or liability relating to this information or the use thereof. The use of this information is governed by the Terms of Use, available at https://www.[employer].com/en/know/clinical-effectiveness-terms   Copyright   Copyright © 2024 UpToDate, Inc. and its affiliates and/or licensors. All rights reserved.

## 2025-04-08 NOTE — Clinical Note
Shasta Kenney was seen and treated in our emergency department on 4/8/2025.                Diagnosis:     Shasta  .    She may return on this date: 04/10/2025         If you have any questions or concerns, please don't hesitate to call.      Milton Valente PA-C    ______________________________           _______________          _______________  Hospital Representative                              Date                                Time

## 2025-04-15 DIAGNOSIS — E66.813 OBESITY, CLASS III, BMI 40-49.9 (MORBID OBESITY): Primary | ICD-10-CM

## 2025-04-15 RX ORDER — TIRZEPATIDE 2.5 MG/.5ML
2.5 INJECTION, SOLUTION SUBCUTANEOUS WEEKLY
Qty: 2 ML | Refills: 0 | Status: SHIPPED | OUTPATIENT
Start: 2025-04-15 | End: 2025-05-13

## 2025-04-29 NOTE — TELEPHONE ENCOUNTER
REFERRAL  Facility:[facility]   Doctor: Bobbi Ford  Phone #:[phone]  FZT:[id number]  DX Code:[id number]  [id number]  Procedure [id number]  Appt Date: 9/12  Insurance: [employer]
Referral done 9/12/19 #   [id number]
Home PT

## 2025-05-21 ENCOUNTER — OFFICE VISIT (OUTPATIENT)
Age: 20
End: 2025-05-21
Payer: COMMERCIAL

## 2025-05-21 VITALS
TEMPERATURE: 97.3 F | SYSTOLIC BLOOD PRESSURE: 110 MMHG | HEIGHT: 64 IN | WEIGHT: 289.5 LBS | HEART RATE: 94 BPM | BODY MASS INDEX: 49.42 KG/M2 | DIASTOLIC BLOOD PRESSURE: 72 MMHG

## 2025-05-21 DIAGNOSIS — E66.813 OBESITY, CLASS III, BMI 40-49.9 (MORBID OBESITY): Primary | ICD-10-CM

## 2025-05-21 DIAGNOSIS — G93.2 PSEUDOTUMOR CEREBRI: ICD-10-CM

## 2025-05-21 DIAGNOSIS — K76.0 HEPATIC STEATOSIS: ICD-10-CM

## 2025-05-21 PROCEDURE — 99203 OFFICE O/P NEW LOW 30 MIN: CPT | Performed by: PHYSICIAN ASSISTANT

## 2025-05-21 RX ORDER — TIRZEPATIDE 5 MG/.5ML
5 INJECTION, SOLUTION SUBCUTANEOUS WEEKLY
Qty: 2 ML | Refills: 2 | Status: SHIPPED | OUTPATIENT
Start: 2025-05-21 | End: 2025-08-13

## 2025-05-21 NOTE — PROGRESS NOTES
Assessment/Plan:  Shasta was seen today for follow-up.    Diagnoses and all orders for this visit:    Obesity, Class III, BMI 40-49.9 (morbid obesity)  -     tirzepatide (Zepbound) 5 mg/0.5 mL auto-injector; Inject 0.5 mL (5 mg total) under the skin once a week    Hepatic steatosis    Pseudotumor cerebri      Zepbound started 5/21/25  Current dose: 2.5mg  Start weight: 289 lbs BMI 49.69 (5/21/25)    - Weight not at goal  - Patient is interested in Conservative Program  - Labs reviewed: As below.    General Recommendations:  Nutrition:  Eat breakfast daily.  Do not skip meals.     Food log (ie.) www.[employer].com, sparkpeople.com, loseit.com, calorieking.com, etc.    Practice mindful eating.  Be sure to set aside time to eat, eat slowly, and savor your food.    Hydration:    At least 64oz of water daily.  No sugar sweetened beverages.  No juice (eat the fruit instead).    Exercise:  Studies have shown that the ideal exercise goal is somewhere between 150 to 300 minutes of moderate intensity exercise a week.  Start with exercising 10 minutes every other day and gradually increase physical activity with a goal of at least 150 minutes of moderate intensity exercise a week, divided over at least 3 days a week.  An example of this would be exercising 30 minutes a day, 5 days a week.  Resistance training can increase muscle mass and increase our resting metabolic rate.   FULL BODY resistance training is recommended 2-3 times a week.  Do not do this on consecutive days to allow for muscle recovery.    Aim for a bare minimum 5000 steps, even on days you do not exercise.    Monitoring:   Weigh yourself daily.  If this causes undue stress, then just weigh yourself once a week.  Weigh yourself the same time of the day with the same amount of clothing on.  Preferably this should be done after waking up, before you eat, and with no clothing or minimal clothing on.    Calorie goal:  2000 sheila/day (Provided with meal plan to  follow).    Return visit:    Calorie tracking/calorie deficit  Physical activity goals - walking + resistance trianing reviewed  AOM  Would avoid phentermine and topamax  Discussed contrave and GLP1 RA.   Continue zepbound. First dose done today of 2.5mg. will escalate to 5mg maintenance thereafter.   - Patient denies personal history of pancreatitis. Patient also denies personal and family history of thyroid cancer and multiple endocrine neoplasia type 2 (MEN 2 tumor).   Zepbound use and side effect profile discussed.   RTC in 4 months          ______________________________________________________________________        Subjective:   Chief Complaint   Patient presents with    Follow-up     [facility] 4 F/U---WAIST: 45in       HPI: Shasta Kenney  is a 19 y.o. female with history of pseudotumor cerebri, oligomenorrhea, migraines and excess weight, here to pursue weight loss management.  Previous notes and records have been reviewed.    Pseudotumor cerebri - acetazolamide     TSH WNL  Fasting glucose WNL  Lipid panel WNL    Weight became an issue with pseudotumor cerebri (2020)diagnosis.   Gained about 30 lbs in the past year.     Previously tried keto diets.   Was previously doing many sports while in .     Calorie tracking with "[employer]pal - currently [id number]    Prescribed wegovy 7/2024 however denied by insurance. Reports she has a new insurance.     Patient has actively been performing dietary and physical modifications for over 6 months without any weight loss.     Sleep study 5/2024 negative for sleep apnea    HPI  Wt Readings from Last 20 Encounters:   05/21/25 131 kg (289 lb 8 oz)   04/08/25 128 kg (283 lb)   04/03/25 128 kg (283 lb)   04/03/25 128 kg (283 lb)   01/15/25 129 kg (283 lb 12.8 oz)   07/02/24 128 kg (281 lb 6.4 oz) (>99%, Z= 2.73)*   05/20/24 125 kg (274 lb 8 oz) (>99%, Z= 2.67)*   03/12/24 122 kg (269 lb 9.6 oz) (>99%, Z= 2.63)*   08/15/23 113 kg (249 lb 12.8 oz) (>99%, Z= 2.46)*   01/26/23 111 kg  (245 lb 9.5 oz) (>99%, Z= 2.42)*   01/24/23 111 kg (243 lb 12.8 oz) (>99%, Z= 2.41)*   01/20/23 111 kg (245 lb 9.6 oz) (>99%, Z= 2.42)*   01/17/23 111 kg (245 lb 9.5 oz) (>99%, Z= 2.42)*   11/02/22 109 kg (240 lb) (>99%, Z= 2.38)*   10/18/22 108 kg (237 lb) (>99%, Z= 2.35)*   08/26/22 102 kg (225 lb 12.8 oz) (99%, Z= 2.26)*   04/22/22 95.1 kg (209 lb 10.5 oz) (98%, Z= 2.11)*   04/04/22 97.1 kg (214 lb) (98%, Z= 2.16)*   03/14/22 97.3 kg (214 lb 9.6 oz) (99%, Z= 2.17)*   11/19/21 91.6 kg (202 lb) (98%, Z= 2.05)*     * Growth percentiles are based on CDC (Girls, 2-20 Years) data.     Excess Weight:  Highest weight: current  Lowest Weight: unclear  Current weight: 281 lbs  Goal: 200 lbs  What has been tried: Diet and Exercise    Food logging: *lives at home with parents  B: skip  S: granola bar  L: left overs   S: cucumbers + cream cheese  D: wings. 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       Past Medical History:   Diagnosis Date    Clotting disorder (HCC)     Headache(784.0)     Obesity     Osgood-Schlatter's disease, left      Patient denies personal and family history of  pancreatitis, thyroid cancer, MEN-2 tumors.  Denies any hx of glaucoma, seizures, kidney stones, gallstones.  Denies Hx of CAD, PAD, palpitations, arrhythmia.   Denies suicidal behavior or thinking , insomnia or sleep disturbance. + anxiety/depression currently not treated     History reviewed. No pertinent surgical history.    The following portions of the patient's history were reviewed and updated as appropriate: allergies, current medications, past family history, past medical history, past social history, past surgical  "history, and problem list.    Review Of Systems:  Review of Systems   Constitutional:  Negative for fatigue and fever.   HENT:  Negative for sore throat, trouble swallowing and voice change.    Respiratory:  Negative for shortness of breath.    Cardiovascular:  Negative for chest pain.   Gastrointestinal:  Negative for abdominal pain, constipation, diarrhea, nausea and vomiting.   Endocrine: Negative for cold intolerance and heat intolerance.   Genitourinary:  Negative for difficulty urinating.   Musculoskeletal:  Negative for arthralgias and back pain.   Psychiatric/Behavioral:  Negative for suicidal ideas. The patient is not nervous/anxious.         + anxiety and depression - not currently on pharmacotherapy   All other systems reviewed and are negative.      Objective:  /72   Pulse 94   Temp (!) 97.3 °F (36.3 °C) (Tympanic)   Ht 5' 4\" (1.626 m)   Wt 131 kg (289 lb 8 oz)   BMI 49.69 kg/m²   Physical Exam  Vitals and nursing note reviewed.   Constitutional:       General: She is not in acute distress.     Appearance: Normal appearance. She is obese. She is not ill-appearing, toxic-appearing or diaphoretic.   HENT:      Head: Normocephalic and atraumatic.     Eyes:      General:         Right eye: No discharge.         Left eye: No discharge.      Conjunctiva/sclera: Conjunctivae normal.     Pulmonary:      Effort: Pulmonary effort is normal. No respiratory distress.     Musculoskeletal:         General: Normal range of motion.      Cervical back: Normal range of motion. No rigidity.      Right lower leg: No edema.      Left lower leg: No edema.     Skin:     Coloration: Skin is not pale.      Findings: No erythema or rash.     Neurological:      General: No focal deficit present.      Mental Status: She is alert.     Psychiatric:         Mood and Affect: Mood normal.         Labs and Imaging  Recent labs and imaging have been personally reviewed.  Lab Results   Component Value Date    WBC 4.36 04/08/2025 " "   HGB 14.8 04/08/2025    HCT 45.5 04/08/2025    MCV 88 04/08/2025     04/08/2025     Lab Results   Component Value Date    SODIUM 135 04/08/2025    K 4.2 04/08/2025     04/08/2025    CO2 21 04/08/2025    AGAP 7 04/08/2025    BUN 16 04/08/2025    CREATININE 0.74 04/08/2025    GLUC 86 04/08/2025    CALCIUM 9.8 04/08/2025    AST 23 04/08/2025    ALT 39 04/08/2025    ALKPHOS 84 04/08/2025    TP 8.0 04/08/2025    TBILI 1.02 (H) 04/08/2025    EGFR 116 04/08/2025     Lab Results   Component Value Date    HGBA1C 4.7 03/25/2022     No results found for: \"[id number]\", \"TSH\"  Lab Results   Component Value Date    CHOLESTEROL 178 (H) 08/16/2023     Lab Results   Component Value Date    HDL 52 08/16/2023     Lab Results   Component Value Date    TRIG 85 08/16/2023     Lab Results   Component Value Date    LDLCALC 108 08/16/2023      "

## 2025-08-18 ENCOUNTER — TELEPHONE (OUTPATIENT)
Age: 20
End: 2025-08-18

## 2025-08-18 DIAGNOSIS — E66.813 OBESITY, CLASS III, BMI 40-49.9 (MORBID OBESITY): Primary | ICD-10-CM

## 2025-08-18 RX ORDER — TIRZEPATIDE 5 MG/.5ML
5 INJECTION, SOLUTION SUBCUTANEOUS WEEKLY
Qty: 2 ML | Refills: 2 | Status: SHIPPED | OUTPATIENT
Start: 2025-08-18 | End: 2025-11-10

## 2025-08-21 ENCOUNTER — TELEPHONE (OUTPATIENT)
Age: 20
End: 2025-08-21